# Patient Record
Sex: FEMALE | Race: WHITE | Employment: FULL TIME | URBAN - METROPOLITAN AREA
[De-identification: names, ages, dates, MRNs, and addresses within clinical notes are randomized per-mention and may not be internally consistent; named-entity substitution may affect disease eponyms.]

---

## 2017-02-14 ENCOUNTER — TELEPHONE (OUTPATIENT)
Dept: CARDIOLOGY CLINIC | Age: 62
End: 2017-02-14

## 2017-02-14 NOTE — TELEPHONE ENCOUNTER
----- Message from Bhavesh Pandey RN sent at 1/26/2017  4:01 PM EST -----  Regarding: FW: Non-Urgent Medical Question  Contact: [phone]      ----- Message -----     From: Donavan Katie     Sent: 1/26/2017   3:56 PM       To: [facility] Nurse Pool  Subject: Non-Urgent Medical Question                      Dr. oTm Hector, I didn't realize the Spironolactone was for blood pressure, so that concerns me. Let me explain. .. My pvcs and heart racing started again beginning of this month. That could be due to stress of having to take on ex employee's job in addition to mine. , not sure. In addition, for a long time I have had trouble getting a deep breath but not all the time. This month, that deep breath issue seemed to be happening more frequently. This breathing issue I have attributed to the walking pneumonia I had back in June of 2015, which I felt like I never quite got over.  told me I may have residual cough and shortness of breath for few weeks after I got well. I went back to doctor in Dec 2016 for  (will continue on new message)      Patient identified with 2 identifiers  Called patient to update message. She states she stopped spironolactone on January 22, and within days, she has been breathing fine, no chest pain, no swelling of her extremities. She has occasional PVC's, but no heart racing like she had felt before. 's over 70's, heart rate in 70's. She has not changed any other medications.   Future Appointments  Date Time Provider Felicita Hernández   5/10/2017 10:20 AM Mehdi Abreu  [address]

## 2017-02-28 RX ORDER — POTASSIUM CHLORIDE 600 MG/1
TABLET, FILM COATED, EXTENDED RELEASE ORAL
Qty: 360 TAB | Refills: 0 | Status: SHIPPED | OUTPATIENT
Start: 2017-02-28 | End: 2017-03-15 | Stop reason: ALTCHOICE

## 2017-03-15 ENCOUNTER — OFFICE VISIT (OUTPATIENT)
Dept: CARDIOLOGY CLINIC | Age: 62
End: 2017-03-15

## 2017-03-15 VITALS
WEIGHT: 216.5 LBS | BODY MASS INDEX: 32.07 KG/M2 | SYSTOLIC BLOOD PRESSURE: 150 MMHG | DIASTOLIC BLOOD PRESSURE: 84 MMHG | HEART RATE: 78 BPM | HEIGHT: 69 IN | RESPIRATION RATE: 16 BRPM

## 2017-03-15 DIAGNOSIS — I10 ESSENTIAL HYPERTENSION: ICD-10-CM

## 2017-03-15 DIAGNOSIS — I25.10 ATHEROSCLEROSIS OF NATIVE CORONARY ARTERY OF NATIVE HEART WITHOUT ANGINA PECTORIS: Primary | ICD-10-CM

## 2017-03-15 DIAGNOSIS — I47.1 PSVT (PAROXYSMAL SUPRAVENTRICULAR TACHYCARDIA) (HCC): ICD-10-CM

## 2017-03-15 RX ORDER — SPIRONOLACTONE 25 MG/1
25 TABLET ORAL DAILY
Qty: 90 TAB | Refills: 3 | Status: SHIPPED | OUTPATIENT
Start: 2017-03-15 | End: 2018-01-02 | Stop reason: SDUPTHER

## 2017-03-15 NOTE — PROGRESS NOTES
[facility] [place]:   [phone]    HPI: Rosangela Frey, a 57y.o. year-old who presents for follow up regarding her palpitations and HTN. She has had all the PVC in 2015- stopped the lasix and they went away until 1/17. Started spironolactone in 2/17. She was having trouble getting a deep breath and that got better when she stopped it. Then 2/5 she started with heart racing and that is bothering her at night now. Has several a week now. Rare after lunch she will feel a pvc. After dinner she often has irregular heartbeats. BP is up as well. The deep breath stuff is getting worse. CXR clear but the congestion continues as well. Labs looked good in 2/16 none since. Discussed the options for palps and htn. Offered stopping the dyazide and starting amlodipine or retrial of spironolactone. She feels like anxiety is part of the problem   Wants to know if she can take a higher dose of ativan. Also discussed verapamil that she took before or bystolic. Discussed potential causes of this gurgling and she will discuss it with Dr. Maribel Son at her visit with him later this afternoon  She is on PPI for GERD and sees Dr. Nara Bean annually   LE edema minimal   Reviewed recent labs - K 4.3 on spironolactone 25mg daily and KCL 8meq 4 tablets daily    -She gets her labs at [facility]  -Prefers that our office follow her labs    Stop potassium and dyazide  Start spironolactone 25mg daily  Keep a log of your blood pressure daily  Follow-up in 6 weeks and bring your blood pressure log. A/P:  1. CAD- calcium score of 250 2011, normal stress echo in 7/15, ECG unchanged today, continue aspirin and pravastatin  2. Hyperlipidemia- LDL 83 in 2/16, on pravastatin 40mg daily and fish oil, LDL goal < 70, will check fasting lipids prior to her return visit  3. Overweight- Body mass index is 31.97 kg/(m^2). encouraged her to exercise  4.  PSVT- palpitations well controlled now, could not tolerate Bystolic, previously discussed suppression medications and anti-anxiety medications, not taking Verapamil SR (had been on it in her 30s for SVT at 220bpm but then stopped by Dr. Luca Bo years later), now doing ok with the change in diuretic so potassium seems to be a culprit  -palpitations worsened by dyazide but doing ok on dyazide/spironolactone/KCL combination  -will check CMP and magnesium level prior to her return visit  5. HTN- well controlled on Dyazide   6. Elevated AST/ALT- resolved, will recheck prior to her return visit   7. Insulin Resistance- A1C 5.4% in 2/16, continue diet and exercise, will recheck A1C prior to her return visit   8. Vit D deficiency- on 1000 units daily, will recheck Vitamin D level prior to her return visit  9. Hypokalemia - K 4.3 on spironolactone 25mg daily, dyazide and KCL 8meq 4 tablets daily  Change to spironolactone today alone  10. Edema - well controlled now on spironolactone and dyazide  11. GERD - on Prilosec, followed by GI/Dr. Sharona Hurtado    Stress echo 7/15 exe 9:00, EF 60%, no WMA  Echo 7/15 EF 65%, normal  Stress nuc 2009 nl EF 67%  Ca Score 2011 250    Fhx +CAD    She  has a past medical history of Calculus of kidney; Chronic venous insufficiency; DDD (degenerative disc disease); GERD (gastroesophageal reflux disease); H/O hiatal hernia; History of actinic keratosis; and Hypertension. Cardiovascular ROS: no chest pain or dyspnea on exertion, positive for palpitations  Respiratory ROS: no cough, shortness of breath, or wheezing  Neurological ROS: no TIA or stroke symptoms  All other systems negative except as above. PE  Vitals:    03/15/17 1109   BP: 150/84   Pulse: 78   Resp: 16   Weight: 216 lb 8 oz (98.2 kg)   Height: 5' 9\" (1.753 m)    Body mass index is 31.97 kg/(m^2).   General appearance - alert, well appearing, and in no distress  Mental status - affect appropriate to mood  Eyes - sclera anicteric, moist mucous membranes  Neck - supple  Lymphatics - not assessed   Chest - clear to auscultation, no wheezes, rales or rhonchi  Heart - normal rate, regular rhythm, normal S1, S2, no murmurs, rubs, clicks or gallops  Abdomen - soft, nontender, nondistended  Back exam - full range of motion, no tenderness  Neurological - cranial nerves II through XII grossly intact, no focal deficit  Musculoskeletal - no muscular tenderness noted, normal strength  Extremities - peripheral pulses normal, trace LE edema  Skin - normal coloration  no rashes    12 lead ECG: NSR    Recent Labs:  Lab Results   Component Value Date/Time    Cholesterol, total 172 02/11/2016 10:27 AM    Cholesterol, Total 181 02/11/2016 10:27 AM    HDL Cholesterol 65 02/11/2016 10:27 AM    LDL, calculated 83 02/11/2016 10:27 AM    Triglyceride 118 02/11/2016 10:27 AM     Lab Results   Component Value Date/Time    Creatinine 0.80 02/11/2016 10:27 AM     Lab Results   Component Value Date/Time    BUN 13 02/11/2016 10:27 AM     Lab Results   Component Value Date/Time    Potassium 4.3 02/11/2016 10:27 AM     Lab Results   Component Value Date/Time    Hemoglobin A1c 5.4 02/11/2016 10:27 AM     No components found for: HGBI,  IHGB,  HGB,  HGBP,  WBHGB  Lab Results   Component Value Date/Time    PLATELET 599 61/89/9267 10:27 AM       Reviewed:  Past Medical History:   Diagnosis Date    Calculus of kidney     Chronic venous insufficiency     DDD (degenerative disc disease)     GERD (gastroesophageal reflux disease)     H/O hiatal hernia     gastritis    History of actinic keratosis     basal cell    Hypertension      History   Smoking Status    Never Smoker   Smokeless Tobacco    Never Used     History   Alcohol Use    0.0 oz/week    0 Standard drinks or equivalent per week     Comment: rarely     Allergies   Allergen Reactions    Boniva [Ibandronate] Other (comments)     Dysphagia      Carac [Fluorouracil] Cough and Other (comments)     Cogestion    Cozaar [Losartan] Shortness of Breath    Lisinopril Cough    Preservative Other (comments)     Preservative in eye drops make eyes red      Red Dye Hives    Sulfa (Sulfonamide Antibiotics) Rash       Current Outpatient Prescriptions   Medication Sig    KLOR-CON 8 8 mEq tablet TAKE 4 TABLETS BY MOUTH EVERY DAY    LORazepam (ATIVAN) 0.5 mg tablet TAKE 1 TABLET BY MOUTH EVERY DAY AS NEEDED FOR ANXIETY    OMEGA-3 FATTY ACIDS/FISH OIL (OMEGA 3 FISH OIL PO) Take  by mouth two (2) times a day. Indications: 1220 mg fish oil 360 mg omega-e    cholecalciferol (VITAMIN D3) 1,000 unit cap Take  by mouth daily.  triamterene-hydrochlorothiazide (DYAZIDE) 37.5-25 mg per capsule Take 1 Cap by mouth daily.  pravastatin (PRAVACHOL) 40 mg tablet TAKE 1 TABLET BY MOUTH EVERY DAY    multivitamin (ONE A DAY) tablet Take 1 Tab by mouth daily.  cranberry extract-vit C (AZO CRANBERRY PLUS VIT C) 250-60 mg cap Take 1 Tab by mouth daily.  omeprazole (PRILOSEC) 20 mg capsule Take 20 mg by mouth daily.  aspirin 81 mg tablet Take 162 mg by mouth daily.  amoxicillin-clavulanate (AUGMENTIN) 875-125 mg per tablet Take 1 Tab by mouth two (2) times a day. No current facility-administered medications for this visit.         [facility] [place]  [address]  [facility], [address] [place] [address]

## 2017-03-15 NOTE — MR AVS SNAPSHOT
Visit Information Date & Time Provider Department Dept. Phone Encounter #  
 3/15/2017 10:40 AM Chandan Mosqueda MD [facility] [place] [phone] [id number] Your Appointments 5/10/2017 10:20 AM  
ESTABLISHED PATIENT with Chandan Mosqueda MD  
[facility] ([address]) Appt Note: 6 month follow up  
 [address]  
[address] [facility] 7 [address] Upcoming Health Maintenance Date Due Hepatitis C Screening 1955 BREAST CANCER SCRN MAMMOGRAM 1/29/2015 PAP AKA CERVICAL CYTOLOGY 10/15/2016 COLONOSCOPY 1/3/2024 DTaP/Tdap/Td series (2 - Td) 9/28/2025 Allergies as of 3/15/2017  Review Complete On: 3/15/2017 By: Nino Finn Severity Noted Reaction Type Reactions Boniva [Ibandronate]  04/25/2012    Other (comments) Dysphagia Carac [Fluorouracil]  10/17/2012    Cough, Other (comments) Cogestion Cozaar [Losartan]  05/18/2012    Shortness of Breath Lisinopril  02/15/2012    Cough Preservative  04/18/2012    Other (comments) Preservative in eye drops make eyes red Red Dye  02/15/2012    Hives Sulfa (Sulfonamide Antibiotics)  02/15/2012    Rash Current Immunizations  Reviewed on 4/14/2014 Name Date Influenza Vaccine 10/1/2016, 10/10/2015, 10/8/2014, 10/14/2013 Influenza Vaccine Whole 9/17/2012 TD Vaccine 10/17/2003 Tdap 9/28/2015 Zoster Vaccine, Live 1/16/2016 Not reviewed this visit You Were Diagnosed With   
  
 Codes Comments Atherosclerosis of native coronary artery of native heart without angina pectoris    -  Primary ICD-10-CM: I25.10 ICD-9-CM: 414.01 Essential hypertension     ICD-10-CM: I10 
ICD-9-CM: 401.9 PSVT (paroxysmal supraventricular tachycardia) (HCC)     ICD-10-CM: I47.1 ICD-9-CM: 427.0 Vitals BP Pulse Resp Height(growth percentile) Weight(growth percentile) BMI  
 150/84 (BP 1 Location: Right arm, BP Patient Position: Sitting) 78 16 5' 9\" (1.753 m) 216 lb 8 oz (98.2 kg) 31.97 kg/m2 OB Status Smoking Status Postmenopausal Never Smoker Vitals History BMI and BSA Data Body Mass Index Body Surface Area  
 31.97 kg/m 2 2.19 m 2 Preferred Pharmacy Pharmacy Name Phone [facility] [facility] [facility] [address] [phone] Your Updated Medication List  
  
   
This list is accurate as of: 3/15/17 11:47 AM.  Always use your most recent med list.  
  
  
  
  
 amoxicillin-clavulanate 875-125 mg per tablet Commonly known as:  AUGMENTIN Take 1 Tab by mouth two (2) times a day. aspirin 81 mg tablet Take 162 mg by mouth daily. cranberry extract-vitamin C 250-60 mg Cap Commonly known as:  AZO CRANBERRY PLUS VIT C Take 1 Tab by mouth daily. LORazepam 0.5 mg tablet Commonly known as:  ATIVAN  
TAKE 1 TABLET BY MOUTH EVERY DAY AS NEEDED FOR ANXIETY  
  
 multivitamin tablet Commonly known as:  ONE A DAY Take 1 Tab by mouth daily. OMEGA 3 FISH OIL PO Take  by mouth two (2) times a day. Indications: 1220 mg fish oil 360 mg omega-e  
  
 omeprazole 20 mg capsule Commonly known as:  PRILOSEC Take 20 mg by mouth daily. pravastatin 40 mg tablet Commonly known as:  PRAVACHOL  
TAKE 1 TABLET BY MOUTH EVERY DAY  
  
 spironolactone 25 mg tablet Commonly known as:  ALDACTONE Take 1 Tab by mouth daily. VITAMIN D3 1,000 unit Cap Generic drug:  cholecalciferol Take  by mouth daily. Prescriptions Sent to Pharmacy Refills  
 spironolactone (ALDACTONE) 25 mg tablet 3 Sig: Take 1 Tab by mouth daily. Class: Normal  
 Pharmacy: [facility] [address] [place] [address] Drive [address] Ph #: [phone] Route: Oral  
  
We Performed the Following AMB POC EKG ROUTINE W/ 12 LEADS, INTER & REP [40276 CPT(R)] Patient Instructions Stop potassium and dyazide Start spironolactone 25mg daily Keep a log of your blood pressure daily Follow-up in 6 weeks and bring your blood pressure log. Introducing \[facility]\"" & [facility]! Dear Erin Murphy: Thank you for requesting a [employer] account. Our records indicate that you already have an active GAMEVIL account. You can access your account anytime at [email or web address] Did you know that you can access your hospital and ER discharge instructions at any time in GAMEVIL? You can also review all of your test results from your hospital stay or ER visit. Additional Information If you have questions, please visit the Frequently Asked Questions section of the GAMEVIL website at [email or web address] Remember, GAMEVIL is NOT to be used for urgent needs. For medical emergencies, dial 911. Now available from your iPhone and Android! Please provide this summary of care documentation to your next provider. Your primary care clinician is listed as Jan Infante. If you have any questions after today's visit, please call [phone].

## 2017-03-15 NOTE — PATIENT INSTRUCTIONS
Stop potassium and dyazide  Start spironolactone 25mg daily  Keep a log of your blood pressure daily  Follow-up in 6 weeks and bring your blood pressure log.

## 2017-04-03 ENCOUNTER — TELEPHONE (OUTPATIENT)
Dept: CARDIOLOGY CLINIC | Age: 62
End: 2017-04-03

## 2017-04-03 RX ORDER — FUROSEMIDE 20 MG/1
20 TABLET ORAL
Qty: 30 TAB | Refills: 6 | Status: SHIPPED | OUTPATIENT
Start: 2017-04-03 | End: 2017-08-09 | Stop reason: SDUPTHER

## 2017-04-03 NOTE — TELEPHONE ENCOUNTER
Patient identified with 2 identifiers  Returned patient phone call after hearing back from Dr. Staci Kwok. She will start lasix 20 mg as needed for swelling. She asked about exercising, i encouraged her to exercise, stop as needed for chest pain, shortness of breath.

## 2017-04-03 NOTE — TELEPHONE ENCOUNTER
The patient requested a call back on [phone]. She wanted to confirm the receipt of her BigTwist message. She stated that all her BP readings are listed. She also wanted to add that she hasn't been feeling well. She has a little cough and dry throat. Thanks!

## 2017-04-03 NOTE — TELEPHONE ENCOUNTER
----- Message from Rossy Maxwell RN sent at 4/3/2017  7:39 AM EDT -----  Regarding: FW: Visit Follow-Up Question  Contact: [phone]      ----- Message -----     From: Cap[place]ano     Sent: 4/2/2017   9:37 PM       To: Eli Lacy  Subject: Visit Smoot Son Dr. Jerald Lepe,  I have continued to keep my blood pressure results since my last email to you. Looks like my BP has been a little higher than last results I sent you. All taken between 6:00 p.m. and 10:00 p.m. after dinner or after watching tv.  3/27 146/69 pulse 68;  3/28 138/67 pulse 73;  3/29 145/73 pulse 70;  3/30 151/69 pulse 66;  3/31 163/78 pulse 58;  4/1 163/78 pulse 68;  4/2 150/71 pulse 67. Also, i am carrying extra fluid since off HCTZ/Triam....rings are tight, ankles swelling some after being up during the day. Not bad but we are going away this weekend and I am concerned ankles will be huge since I will be on my feet a lot. Felix Romero    Patient identified with 2 identifiers  Returned patient phone call, patient wanted to be sure we had gotten her message, as well as to know that she has a bad cold, she has not taken any cold medication. She is not short of breath and no chest pain, she is just concerned about the increased leg edema. Please advise, thank you.

## 2017-04-18 ENCOUNTER — OFFICE VISIT (OUTPATIENT)
Dept: INTERNAL MEDICINE CLINIC | Age: 62
End: 2017-04-18

## 2017-04-18 ENCOUNTER — TELEPHONE (OUTPATIENT)
Dept: CARDIOLOGY CLINIC | Age: 62
End: 2017-04-18

## 2017-04-18 VITALS
DIASTOLIC BLOOD PRESSURE: 80 MMHG | TEMPERATURE: 99.6 F | RESPIRATION RATE: 18 BRPM | HEIGHT: 69 IN | HEART RATE: 95 BPM | BODY MASS INDEX: 32.73 KG/M2 | OXYGEN SATURATION: 95 % | WEIGHT: 221 LBS | SYSTOLIC BLOOD PRESSURE: 140 MMHG

## 2017-04-18 DIAGNOSIS — R09.82 POST-NASAL DRIP: ICD-10-CM

## 2017-04-18 DIAGNOSIS — R05.9 COUGH: ICD-10-CM

## 2017-04-18 DIAGNOSIS — Z20.828 EXPOSURE TO INFLUENZA: Primary | ICD-10-CM

## 2017-04-18 LAB
QUICKVUE INFLUENZA TEST: NEGATIVE
VALID INTERNAL CONTROL?: YES

## 2017-04-18 RX ORDER — MUPIROCIN 20 MG/G
OINTMENT TOPICAL
COMMUNITY
Start: 2017-04-17 | End: 2017-07-12 | Stop reason: ALTCHOICE

## 2017-04-18 RX ORDER — HYDROCORTISONE 25 MG/G
CREAM TOPICAL
Refills: 1 | COMMUNITY
Start: 2017-02-13 | End: 2017-07-12 | Stop reason: ALTCHOICE

## 2017-04-18 RX ORDER — OSELTAMIVIR PHOSPHATE 75 MG/1
75 CAPSULE ORAL DAILY
Qty: 10 CAP | Refills: 0 | Status: SHIPPED | OUTPATIENT
Start: 2017-04-18 | End: 2017-04-28

## 2017-04-18 NOTE — MR AVS SNAPSHOT
Visit Information Date & Time Provider Department Dept. Phone Encounter #  
 4/18/2017 11:00 AM [facility]  Your Appointments 5/24/2017  9:20 AM  
ESTABLISHED PATIENT with Kourtney Castillo MD  
[facility] ([facility]) Appt Note: 6 month follow up; 6 month follow up pt r/s from [address]  
[address] [facility] 7 [address] Upcoming Health Maintenance Date Due Hepatitis C Screening 1955 BREAST CANCER SCRN MAMMOGRAM 1/29/2015 PAP AKA CERVICAL CYTOLOGY 10/15/2016 COLONOSCOPY 1/3/2024 DTaP/Tdap/Td series (2 - Td) 9/28/2025 Allergies as of 4/18/2017  Review Complete On: 4/18/2017 By: Olamide Cabrera LPN Severity Noted Reaction Type Reactions Boniva [Ibandronate]  04/25/2012    Other (comments) Dysphagia Carac [Fluorouracil]  10/17/2012    Cough, Other (comments) Cogestion Cozaar [Losartan]  05/18/2012    Shortness of Breath Lisinopril  02/15/2012    Cough Preservative  04/18/2012    Other (comments) Preservative in eye drops make eyes red Red Dye  02/15/2012    Hives Sulfa (Sulfonamide Antibiotics)  02/15/2012    Rash Current Immunizations  Reviewed on 4/14/2014 Name Date Influenza Vaccine 10/1/2016, 10/10/2015, 10/8/2014, 10/14/2013 Influenza Vaccine Whole 9/17/2012 TD Vaccine 10/17/2003 Tdap 9/28/2015 Zoster Vaccine, Live 1/16/2016 Not reviewed this visit You Were Diagnosed With   
  
 Codes Comments Exposure to influenza    -  Primary ICD-10-CM: Z20.828 ICD-9-CM: V01.79 Cough     ICD-10-CM: R05 ICD-9-CM: 786.2 Post-nasal drip     ICD-10-CM: R09.82 ICD-9-CM: 784.91 Vitals BP Pulse Temp Resp Height(growth percentile) Weight(growth percentile) 140/80 (BP 1 Location: Left arm, BP Patient Position: Sitting) 95 99.6 °F (37.6 °C) (Oral) 18 5' 9\" (1.753 m) 221 lb (100.2 kg) SpO2 BMI OB Status Smoking Status 95% 32.64 kg/m2 Postmenopausal Never Smoker Vitals History BMI and BSA Data Body Mass Index Body Surface Area  
 32.64 kg/m 2 2.21 m 2 Preferred Pharmacy Pharmacy Name Phone [address], [address] [place] [address] [address] [phone] Your Updated Medication List  
  
   
This list is accurate as of: 4/18/17 12:20 PM.  Always use your most recent med list.  
  
  
  
  
 aspirin 81 mg tablet Take 162 mg by mouth daily. cranberry extract-vitamin C 250-60 mg Cap Commonly known as:  AZO CRANBERRY PLUS VIT C Take 1 Tab by mouth daily. furosemide 20 mg tablet Commonly known as:  LASIX Take 1 Tab by mouth daily as needed. hydrocortisone 2.5 % topical cream  
Commonly known as:  HYTONE From Derm for face LORazepam 0.5 mg tablet Commonly known as:  ATIVAN  
TAKE 1 TABLET BY MOUTH EVERY DAY AS NEEDED FOR ANXIETY  
  
 multivitamin tablet Commonly known as:  ONE A DAY Take 1 Tab by mouth daily. mupirocin 2 % ointment Commonly known as:  [facility] For nasal lesion OMEGA 3 FISH OIL PO Take  by mouth two (2) times a day. Indications: 1220 mg fish oil 360 mg omega-e  
  
 omeprazole 20 mg capsule Commonly known as:  PRILOSEC Take 20 mg by mouth daily. oseltamivir 75 mg capsule Commonly known as:  TAMIFLU Take 1 Cap by mouth daily for 10 days. pravastatin 40 mg tablet Commonly known as:  PRAVACHOL  
TAKE 1 TABLET BY MOUTH EVERY DAY  
  
 spironolactone 25 mg tablet Commonly known as:  ALDACTONE Take 1 Tab by mouth daily. VITAMIN D3 1,000 unit Cap Generic drug:  cholecalciferol Take  by mouth daily. Prescriptions Printed Refills oseltamivir (TAMIFLU) 75 mg capsule 0 Sig: Take 1 Cap by mouth daily for 10 days. Class: Print Route: Oral  
  
We Performed the Following AMB POC RAPID INFLUENZA TEST [86079 CPT(R)] Patient Instructions Saline Nasal Spray - use liberally with head tilted back - to loosen and flush post-nasal mucus - OK to swallow Mucinex 600 mg tablets - PLAIN - Blue Box - one pill twice a day with full glass of water to help make mucus more easy to move Hydration - helps thin mucus Ask Dr. Ernie Maya whether you could take a long-acting anti-histamine to help manage your allergies (Zyrtec or Allegra or Claritin). Introducing [facility]! Dear Sena Bravo: Thank you for requesting a [employer] account. Our records indicate that you already have an active [employer] account. You can access your account anytime at [email or web address] Did you know that you can access your hospital and ER discharge instructions at any time in [employer]? You can also review all of your test results from your hospital stay or ER visit. Additional Information If you have questions, please visit the Frequently Asked Questions section of the [employer] website at [email or web address] Remember, [employer] is NOT to be used for urgent needs. For medical emergencies, dial 911. Now available from your iPhone and Android! Please provide this summary of care documentation to your next provider. Your primary care clinician is listed as Trini Judge. If you have any questions after today's visit, please call [phone].

## 2017-04-18 NOTE — TELEPHONE ENCOUNTER
([phone]) Pt walked into office wanting to know if she can take Zyrtec, Allegra or Claritin-  As as a long acting anti- histamine to help manage her allergies?   Pt would like to be called at your earliest conveniest.    Thanks Erica

## 2017-04-18 NOTE — TELEPHONE ENCOUNTER
Returned patient's call,  2 pt identifiers used  The following message given per NP Neelam:    She may take Allegra but not Allegra-D, can take Claritin but not Claritin-D, can take Zyrtec but not Zyrtec-D.  No pseudoephedrine. neelam     Patient voiced understanding.

## 2017-04-18 NOTE — PATIENT INSTRUCTIONS
Saline Nasal Spray - use liberally with head tilted back - to loosen and flush post-nasal mucus - OK to swallow    Mucinex 600 mg tablets - PLAIN - Blue Box - one pill twice a day with full glass of water to help make mucus more easy to move    Hydration - helps thin mucus    Ask Dr. Mahamed Baker whether you could take a long-acting anti-histamine to help manage your allergies (Zyrtec or Allegra or Claritin).

## 2017-04-18 NOTE — PROGRESS NOTES
58 yo female with cough intermittently since Dec.  Tx for allergies and an antibiotic didn't completely resolve this cough. CXR in Nov was normal.   For 3 days the cough has felt worse. Last night, she had chills, and today she feels achy. She is clearing her throat a lot, too. Her  was seen at an Urgent Care 3 days ago, and placed on Tamiflu and a med for cough suppression. PE: WNWD WF w dry-sounding cough   T - 99.6 in AM   Phar - nl   Neck - no nodes   TMs - nl   Lungs - clear  Rapid Influenza Test - NEG    Imp: Persistent cough likely due to Post-nasal drip   Exposure to Influenza    Plan: Tamiflu for 10 days   Saline NS   Hydration and Mucinex   Consider Zyrtec or Allegra (she will consult with her Cardiologist)  ______________________  Expected course of current diagnosed problem(s) as well as expected progression and possible complications, and desired follow up with provider are discussed with patient. Patient is encouraged to be back in touch with any questions or concerns. Patient expresses understanding of plan of care. Patient is given AVS which includes diagnoses, current medications, vitals.

## 2017-04-18 NOTE — PROGRESS NOTES
Chief Complaint   Patient presents with    Cough     Patient stated her  tested positive through a blood test on Saturday.     Generalized Body Aches    Chills

## 2017-04-25 ENCOUNTER — TELEPHONE (OUTPATIENT)
Dept: INTERNAL MEDICINE CLINIC | Age: 62
End: 2017-04-25

## 2017-04-25 NOTE — TELEPHONE ENCOUNTER
Magali Ibrahim  1955     Pt called stating that she still has a cough and would like to know hoe long she she is suppose to let it continue before she need to come back in.

## 2017-04-25 NOTE — TELEPHONE ENCOUNTER
Cough not as bad, but still present. The \"coakiness\" is gone. She is still having her \"breathing issue\" which is long-standing. Her Cardiologist told her she needs to walk and exercise more. No hx asthma. She takes Omeprazole with almost complete resolution of GERD w/ water brash. She continues with the saline NS, Mucinex, Anti-histamine. Pulmonary testing?

## 2017-05-18 ENCOUNTER — TELEPHONE (OUTPATIENT)
Dept: CARDIOLOGY CLINIC | Age: 62
End: 2017-05-18

## 2017-05-18 NOTE — TELEPHONE ENCOUNTER
I spoke to technician at [facility] labs, 2 pt identifiers used  Advised to do regular lipid panel at this time. She was unable to find a comparable lab as [facility] NMR. Also did not know if a more in depth test would be an additional cost to the patient.

## 2017-05-18 NOTE — TELEPHONE ENCOUNTER
Marlyn Oconnor with [facility] called to advise that they don't do the NMR's and stated they have something that's compatible. If interested give her a call back to [phone].  [employer]

## 2017-05-24 ENCOUNTER — OFFICE VISIT (OUTPATIENT)
Dept: CARDIOLOGY CLINIC | Age: 62
End: 2017-05-24

## 2017-05-24 VITALS
WEIGHT: 224 LBS | SYSTOLIC BLOOD PRESSURE: 150 MMHG | HEART RATE: 69 BPM | BODY MASS INDEX: 33.18 KG/M2 | RESPIRATION RATE: 16 BRPM | HEIGHT: 69 IN | DIASTOLIC BLOOD PRESSURE: 80 MMHG | OXYGEN SATURATION: 97 %

## 2017-05-24 DIAGNOSIS — E66.3 OVERWEIGHT (BMI 25.0-29.9): ICD-10-CM

## 2017-05-24 DIAGNOSIS — E78.5 HYPERLIPIDEMIA, UNSPECIFIED HYPERLIPIDEMIA TYPE: ICD-10-CM

## 2017-05-24 DIAGNOSIS — I10 ESSENTIAL HYPERTENSION: ICD-10-CM

## 2017-05-24 DIAGNOSIS — I25.10 ATHEROSCLEROSIS OF NATIVE CORONARY ARTERY OF NATIVE HEART WITHOUT ANGINA PECTORIS: Primary | ICD-10-CM

## 2017-05-24 DIAGNOSIS — I47.1 PSVT (PAROXYSMAL SUPRAVENTRICULAR TACHYCARDIA) (HCC): ICD-10-CM

## 2017-05-24 RX ORDER — AMLODIPINE BESYLATE 2.5 MG/1
2.5 TABLET ORAL DAILY
Qty: 30 TAB | Refills: 3 | Status: SHIPPED | OUTPATIENT
Start: 2017-05-24 | End: 2017-06-19 | Stop reason: SDUPTHER

## 2017-05-24 NOTE — PROGRESS NOTES
[place]:   [phone]    HPI: Zarina Doss, a 57y.o. year-old who presents for follow up regarding her palpitations and HTN. Palpitations are  Better, the ones that last hours age gone and she is happy about that. Breathing is a little better as well. She is very frustrated about her weight. Exercising regularly every day but worrying about her weight every day. Reviewed her labs, lipids look great, K is fine, lft ok. She takes lasix prn and will often take it three days in a row when needed. She has had all the PVC in 2015- stopped the lasix and they went away until 1/17. Started spironolactone in 2/17. She was having trouble getting a deep breath and that got better when she stopped it. Then 2/5 she started with heart racing and that is bothering her at night now. Has several a week now. Rare after lunch she will feel a pvc. After dinner she often has irregular heartbeats. BP is up as well. The deep breath stuff is getting worse. CXR clear but the congestion continues as well. Labs looked good in 2/16 none since. Discussed the options for palps and htn. Offered stopping the dyazide and starting amlodipine or retrial of spironolactone. She feels like anxiety is part of the problem   Wants to know if she can take a higher dose of ativan. Also discussed verapamil that she took before or bystolic.   n  She is on PPI for GERD and sees Dr. Allyson Shah annually   LE edema minimal     -She gets her labs at [facility]  -Prefers that our office follow her labs    Start spironolactone 25mg daily  Bp looks great today but she has been worried about it, 160/70 at home. Yesterday it was 190 at [facility] after she was very stressed. My recheck today 150/70. A/P:  1. CAD- calcium score of 250 2011, continue aspirin and pravastatin  2. Hyperlipidemia- LDL 83 in 2/16, on pravastatin 40mg daily and fish oil, LDL goal < 70, will check fasting lipids prior to her return visit  3.  Overweight- Body mass index is 33.08 kg/(m^2). encouraged her to exercise  4. PSVT- palpitations well controlled now, could not tolerate Bystolic, previously discussed suppression medications and anti-anxiety medications, not taking Verapamil SR (had been on it in her 30s for SVT at 220bpm but then stopped by Dr. Seven Holder years later), now doing ok with the change in diuretic so potassium seems to be a culprit  -palpitations worsened by dyazide but for a while doing ok on dyazide/spironolactone/KCL combination, now palps good on spironolactone alone but   -will check CMP and magnesium level prior to her return visit  5. HTN- add amlodipine today and continue spironolactone  6. Elevated AST/ALT- resolved, will recheck prior to her return visit   7. Insulin Resistance- A1C 5.4% in 2/16, continue diet and exercise, will recheck A1C prior to her return visit   8. Vit D deficiency- on 1000 units daily, will recheck Vitamin D level prior to her return visit  9. Hypokalemia - stable now, K 4.1  10. Edema - well controlled now on spironolactone and dyazide  11. GERD - on Prilosec, followed by GI/Dr. Tommy Bains    Stress echo 7/15 exe 9:00, EF 60%, no WMA  Echo 7/15 EF 65%, normal  Stress nuc 2009 nl EF 67%  Ca Score 2011 250    Fhx +CAD    She  has a past medical history of Calculus of kidney; Chronic venous insufficiency; DDD (degenerative disc disease); GERD (gastroesophageal reflux disease); H/O hiatal hernia; History of actinic keratosis; and Hypertension. Cardiovascular ROS: no chest pain or dyspnea on exertion, positive for palpitations  Respiratory ROS: no cough, shortness of breath, or wheezing  Neurological ROS: no TIA or stroke symptoms  All other systems negative except as above. PE  Vitals:    05/24/17 0917 05/24/17 0920   BP: 130/80 130/80   Pulse: 69    Resp: 16    SpO2: 97%    Weight: 224 lb (101.6 kg)    Height: 5' 9\" (1.753 m)     Body mass index is 33.08 kg/(m^2).   General appearance - alert, well appearing, and in no distress  Mental status - affect appropriate to mood  Eyes - sclera anicteric, moist mucous membranes  Neck - supple  Lymphatics - not assessed   Chest - clear to auscultation, no wheezes, rales or rhonchi  Heart - normal rate, regular rhythm, normal S1, S2, no murmurs, rubs, clicks or gallops  Abdomen - soft, nontender, nondistended  Back exam - full range of motion, no tenderness  Neurological - cranial nerves II through XII grossly intact, no focal deficit  Musculoskeletal - no muscular tenderness noted, normal strength  Extremities - peripheral pulses normal, trace LE edema  Skin - normal coloration  no rashes    12 lead ECG: NSR    Recent Labs:  Lab Results   Component Value Date/Time    Cholesterol, total 172 02/11/2016 10:27 AM    Cholesterol, Total 181 02/11/2016 10:27 AM    HDL Cholesterol 65 02/11/2016 10:27 AM    LDL, calculated 83 02/11/2016 10:27 AM    Triglyceride 118 02/11/2016 10:27 AM     Lab Results   Component Value Date/Time    Creatinine 0.80 02/11/2016 10:27 AM     Lab Results   Component Value Date/Time    BUN 13 02/11/2016 10:27 AM     Lab Results   Component Value Date/Time    Potassium 4.3 02/11/2016 10:27 AM     Lab Results   Component Value Date/Time    Hemoglobin A1c 5.4 02/11/2016 10:27 AM     No components found for: HGBI,  IHGB,  HGB,  HGBP,  WBHGB  Lab Results   Component Value Date/Time    PLATELET 380 14/86/1764 10:27 AM       Reviewed:  Past Medical History:   Diagnosis Date    Calculus of kidney     Chronic venous insufficiency     DDD (degenerative disc disease)     GERD (gastroesophageal reflux disease)     H/O hiatal hernia     gastritis    History of actinic keratosis     basal cell    Hypertension      History   Smoking Status    Never Smoker   Smokeless Tobacco    Never Used     History   Alcohol Use    0.0 oz/week    0 Standard drinks or equivalent per week     Comment: rarely     Allergies   Allergen Reactions    Boniva [Ibandronate] Other (comments) Dysphagia      Carac [Fluorouracil] Cough and Other (comments)     Cogestion    Cozaar [Losartan] Shortness of Breath    Lisinopril Cough    Preservative Other (comments)     Preservative in eye drops make eyes red      Red Dye Hives    Sulfa (Sulfonamide Antibiotics) Rash       Current Outpatient Prescriptions   Medication Sig    hydrocortisone (HYTONE) 2.5 % topical cream From Derm for face    mupirocin (BACTROBAN) 2 % ointment For nasal lesion    furosemide (LASIX) 20 mg tablet Take 1 Tab by mouth daily as needed.  spironolactone (ALDACTONE) 25 mg tablet Take 1 Tab by mouth daily.  LORazepam (ATIVAN) 0.5 mg tablet TAKE 1 TABLET BY MOUTH EVERY DAY AS NEEDED FOR ANXIETY    OMEGA-3 FATTY ACIDS/FISH OIL (OMEGA 3 FISH OIL PO) Take  by mouth two (2) times a day. Indications: 1220 mg fish oil 360 mg omega-e    cholecalciferol (VITAMIN D3) 1,000 unit cap Take  by mouth daily.  pravastatin (PRAVACHOL) 40 mg tablet TAKE 1 TABLET BY MOUTH EVERY DAY    multivitamin (ONE A DAY) tablet Take 1 Tab by mouth daily.  cranberry extract-vit C (AZO CRANBERRY PLUS VIT C) 250-60 mg cap Take 1 Tab by mouth daily.  omeprazole (PRILOSEC) 20 mg capsule Take 20 mg by mouth daily.  aspirin 81 mg tablet Take 162 mg by mouth daily. No current facility-administered medications for this visit.         [facility] [place]  [address]

## 2017-05-24 NOTE — MR AVS SNAPSHOT
Visit Information Date & Time Provider Department Dept. Phone Encounter #  
 5/24/2017  9:20 AM Pamala Bumpers, MD [facility] [phone] [id number] Follow-up Instructions Return in about 4 months (around 9/24/2017). Follow-up and Disposition History Upcoming Health Maintenance Date Due Hepatitis C Screening 1955 BREAST CANCER SCRN MAMMOGRAM 1/29/2015 PAP AKA CERVICAL CYTOLOGY 10/15/2016 INFLUENZA AGE 9 TO ADULT 8/1/2017 COLONOSCOPY 1/3/2024 DTaP/Tdap/Td series (2 - Td) 9/28/2025 Allergies as of 5/24/2017  Review Complete On: 5/24/2017 By: Rudy Mejia Severity Noted Reaction Type Reactions Boniva [Ibandronate]  04/25/2012    Other (comments) Dysphagia Carac [Fluorouracil]  10/17/2012    Cough, Other (comments) Cogestion Cozaar [Losartan]  05/18/2012    Shortness of Breath Lisinopril  02/15/2012    Cough Preservative  04/18/2012    Other (comments) Preservative in eye drops make eyes red Red Dye  02/15/2012    Hives Sulfa (Sulfonamide Antibiotics)  02/15/2012    Rash Current Immunizations  Reviewed on 4/14/2014 Name Date Influenza Vaccine 10/1/2016, 10/10/2015, 10/8/2014, 10/14/2013 Influenza Vaccine Whole 9/17/2012 TD Vaccine 10/17/2003 Tdap 9/28/2015 Zoster Vaccine, Live 1/16/2016 Not reviewed this visit You Were Diagnosed With   
  
 Codes Comments Atherosclerosis of native coronary artery of native heart without angina pectoris    -  Primary ICD-10-CM: I25.10 ICD-9-CM: 414.01 Hyperlipidemia, unspecified hyperlipidemia type     ICD-10-CM: E78.5 ICD-9-CM: 272.4 PSVT (paroxysmal supraventricular tachycardia) (HCC)     ICD-10-CM: I47.1 ICD-9-CM: 427.0 Essential hypertension     ICD-10-CM: I10 
ICD-9-CM: 401.9 Overweight (BMI 25.0-29. 9)     ICD-10-CM: S97.8 ICD-9-CM: 278.02 Vitals BP Pulse Resp Height(growth percentile) Weight(growth percentile) SpO2  
 150/80 (BP 1 Location: Right arm, BP Patient Position: Sitting) 69 16 5' 9\" (1.753 m) 224 lb (101.6 kg) 97% BMI OB Status Smoking Status 33.08 kg/m2 Postmenopausal Never Smoker Vitals History BMI and BSA Data Body Mass Index Body Surface Area 33.08 kg/m 2 2.22 m 2 Preferred Pharmacy Pharmacy Name Phone [address], [address] [facility] [address] [phone] Your Updated Medication List  
  
   
This list is accurate as of: 5/24/17 10:04 AM.  Always use your most recent med list. amLODIPine 2.5 mg tablet Commonly known as:  Dion Beata Take 1 Tab by mouth daily. aspirin 81 mg tablet Take 162 mg by mouth daily. cranberry extract-vitamin C 250-60 mg Cap Commonly known as:  AZO CRANBERRY PLUS VIT C Take 1 Tab by mouth daily. furosemide 20 mg tablet Commonly known as:  LASIX Take 1 Tab by mouth daily as needed. hydrocortisone 2.5 % topical cream  
Commonly known as:  HYTONE From Derm for face LORazepam 0.5 mg tablet Commonly known as:  ATIVAN  
TAKE 1 TABLET BY MOUTH EVERY DAY AS NEEDED FOR ANXIETY  
  
 multivitamin tablet Commonly known as:  ONE A DAY Take 1 Tab by mouth daily. mupirocin 2 % ointment Commonly known as:  [facility] For nasal lesion OMEGA 3 FISH OIL PO Take  by mouth two (2) times a day. Indications: 1220 mg fish oil 360 mg omega-e  
  
 omeprazole 20 mg capsule Commonly known as:  PRILOSEC Take 20 mg by mouth daily. pravastatin 40 mg tablet Commonly known as:  PRAVACHOL  
TAKE 1 TABLET BY MOUTH EVERY DAY  
  
 spironolactone 25 mg tablet Commonly known as:  ALDACTONE Take 1 Tab by mouth daily. VITAMIN D3 1,000 unit Cap Generic drug:  cholecalciferol Take  by mouth daily. Prescriptions Sent to Pharmacy Refills  
 amLODIPine (NORVASC) 2.5 mg tablet 3 Sig: Take 1 Tab by mouth daily. Class: Normal  
 Pharmacy: [facility] [address] [place] [address] Drive [address]  #: [phone] Route: Oral  
  
We Performed the Following AMB POC EKG ROUTINE W/ 12 LEADS, INTER & REP [98335 CPT(R)] Follow-up Instructions Return in about 4 months (around 9/24/2017). Introducing [facility]! Dear Geoffrey Royal: Thank you for requesting a [employer] account. Our records indicate that you already have an active [employer] account. You can access your account anytime at [email or web address]. "[email or web address] Did you know that you can access your hospital and ER discharge instructions at any time in [employer]? You can also review all of your test results from your hospital stay or ER visit. Additional Information If you have questions, please visit the Frequently Asked Questions section of the [employer] website at [email or web address] Remember, [employer] is NOT to be used for urgent needs. For medical emergencies, dial 911. Now available from your iPhone and Android! Please provide this summary of care documentation to your next provider. Your primary care clinician is listed as Caridad Carlton. If you have any questions after today's visit, please call [phone].

## 2017-05-30 RX ORDER — POTASSIUM CHLORIDE 600 MG/1
TABLET, FILM COATED, EXTENDED RELEASE ORAL
Qty: 360 TAB | Refills: 0 | Status: SHIPPED | OUTPATIENT
Start: 2017-05-30 | End: 2018-01-03 | Stop reason: SDUPTHER

## 2017-06-06 ENCOUNTER — TELEPHONE (OUTPATIENT)
Dept: CARDIOLOGY CLINIC | Age: 62
End: 2017-06-06

## 2017-06-06 NOTE — TELEPHONE ENCOUNTER
Alvin Gus Getachew calling to f/u on her [address] email. She can be reached at [phone]. Thanks!

## 2017-06-06 NOTE — TELEPHONE ENCOUNTER
Returned patient's call, 2 pt identifiers used  Advised patient her my-chart e-mail has been received and  Also Dr. Eleazar Mondragon is out of the office until 6/12/17. Her message has been sent to ROBERTO Reza.

## 2017-06-16 RX ORDER — AMLODIPINE BESYLATE 2.5 MG/1
2.5 TABLET ORAL DAILY
Qty: 30 TAB | Refills: 3 | OUTPATIENT
Start: 2017-06-16

## 2017-06-19 RX ORDER — AMLODIPINE BESYLATE 2.5 MG/1
5 TABLET ORAL DAILY
Qty: 60 TAB | Refills: 11 | Status: SHIPPED | OUTPATIENT
Start: 2017-06-19 | End: 2018-04-20 | Stop reason: SDUPTHER

## 2017-06-22 RX ORDER — LORAZEPAM 0.5 MG/1
TABLET ORAL
Qty: 60 TAB | Refills: 3 | Status: SHIPPED | OUTPATIENT
Start: 2017-06-22 | End: 2021-07-02 | Stop reason: ALTCHOICE

## 2017-06-22 NOTE — TELEPHONE ENCOUNTER
Requested Prescriptions     Signed Prescriptions Disp Refills    LORazepam (ATIVAN) 0.5 mg tablet 60 Tab 3     Sig: TAKE 1 TABLET BY MOUTH TWICE A DAY AS NEEDED FOR ANXIETY     Authorizing Provider: Loni Douglass     Ordering User: Asia Carvajal

## 2017-06-28 ENCOUNTER — TELEPHONE (OUTPATIENT)
Dept: CARDIOLOGY CLINIC | Age: 62
End: 2017-06-28

## 2017-06-28 NOTE — TELEPHONE ENCOUNTER
Returned patient's call, 2 pt identifiers used  Answered all questions pertaining to upcoming stress echo. No further questions.

## 2017-06-28 NOTE — TELEPHONE ENCOUNTER
Please call Mrs. Inderjit Richardson at [phone]. She has sent an email to Dr. Princess Mondragon however she'd like to be sure that someone's aware of the email and that someone will get back with her. Re:  Difficulty breathing has worsened over this past weekend.      Thank you, Cheryl Hood

## 2017-06-28 NOTE — TELEPHONE ENCOUNTER
Returned patient's call, 2 pt identifiers used  Patient has been having increased shortness of breath, known cad on ca score. Scheduled patient for a stress echo per ROBERTO Black. Pre testing instructions given to patient.     Future Appointments  Date Time Provider Felicita Hernández   7/6/2017 10:00 AM ECHO, [address] [facility]   7/6/2017 10:00 AM STRESSECHO, [address] [facility]   7/12/2017 9:40 AM Kia Garnica  [address]   10/4/2017 9:40 AM Kia Garnica  [address]

## 2017-07-06 ENCOUNTER — CLINICAL SUPPORT (OUTPATIENT)
Dept: CARDIOLOGY CLINIC | Age: 62
End: 2017-07-06

## 2017-07-06 ENCOUNTER — TELEPHONE (OUTPATIENT)
Dept: CARDIOLOGY CLINIC | Age: 62
End: 2017-07-06

## 2017-07-06 DIAGNOSIS — I25.10 CORONARY ARTERY DISEASE DUE TO LIPID RICH PLAQUE: ICD-10-CM

## 2017-07-06 DIAGNOSIS — R06.02 SHORTNESS OF BREATH: Primary | ICD-10-CM

## 2017-07-06 DIAGNOSIS — I25.83 CORONARY ARTERY DISEASE DUE TO LIPID RICH PLAQUE: ICD-10-CM

## 2017-07-12 ENCOUNTER — DOCUMENTATION ONLY (OUTPATIENT)
Dept: CARDIOLOGY CLINIC | Age: 62
End: 2017-07-12

## 2017-07-12 ENCOUNTER — OFFICE VISIT (OUTPATIENT)
Dept: CARDIOLOGY CLINIC | Age: 62
End: 2017-07-12

## 2017-07-12 VITALS
WEIGHT: 225.4 LBS | HEART RATE: 74 BPM | RESPIRATION RATE: 16 BRPM | SYSTOLIC BLOOD PRESSURE: 134 MMHG | HEIGHT: 69 IN | BODY MASS INDEX: 33.38 KG/M2 | DIASTOLIC BLOOD PRESSURE: 80 MMHG

## 2017-07-12 DIAGNOSIS — E78.5 HYPERLIPIDEMIA, UNSPECIFIED HYPERLIPIDEMIA TYPE: ICD-10-CM

## 2017-07-12 DIAGNOSIS — I47.1 PSVT (PAROXYSMAL SUPRAVENTRICULAR TACHYCARDIA) (HCC): ICD-10-CM

## 2017-07-12 DIAGNOSIS — E66.3 OVERWEIGHT (BMI 25.0-29.9): ICD-10-CM

## 2017-07-12 DIAGNOSIS — I10 ESSENTIAL HYPERTENSION: ICD-10-CM

## 2017-07-12 DIAGNOSIS — I25.10 ATHEROSCLEROSIS OF NATIVE CORONARY ARTERY OF NATIVE HEART WITHOUT ANGINA PECTORIS: ICD-10-CM

## 2017-07-12 DIAGNOSIS — I49.3 PVC (PREMATURE VENTRICULAR CONTRACTION): ICD-10-CM

## 2017-07-12 DIAGNOSIS — R06.02 SHORTNESS OF BREATH: Primary | ICD-10-CM

## 2017-07-12 RX ORDER — FAMOTIDINE 20 MG
20 TABLET ORAL 2 TIMES DAILY
COMMUNITY

## 2017-07-12 NOTE — PROGRESS NOTES
[place]:   [phone]    HPI: Kenney Jansen, a 57y.o. year-old who presents for follow up regarding her palpitations and HTN. She feels like she can't take a dep breath and still has a cough and congestion and like a heaviness with taking a deep breath. Wants to know my thoughts on that. Appears to have trouble with the deep breath. She feels like she has a sinus drainage and feels strangled at times, has a chronic cough. Continues to exercise ok and is not limited. Has some foot pain. Continues to have tightness and fluid in the ankles and it is worse at the end of the day an with sitting or standing. The PVC are really a big source of stress for her. She took a lasix and it made them worse. So will trial taking the lasix plus potassium as needed. She is very frustrated about her weight. Exercising regularly every day but worrying about her weight every day. Reviewed her labs, lipids look great, K is fine, lft ok. Discussed EP eval to see if they can ablate the PVC, but she is not     She has had all the PVC in 2015- stopped the lasix and they went away until 1/17. Started spironolactone in 2/17. She was having trouble getting a deep breath and that got better when she stopped it. Then 2/5 she started with heart racing and that is bothering her at night now. Has several a week now. Rare after lunch she will feel a pvc. After dinner she often has irregular heartbeats. BP is up as well. The deep breath stuff is getting worse. CXR clear but the congestion continues as well. Labs looked good in 2/16 none since. Discussed the options for palps and htn. Offered stopping the dyazide and starting amlodipine or retrial of spironolactone. She feels like anxiety is part of the problem   Wants to know if she can take a higher dose of ativan. Also discussed verapamil that she took before or bystolic.      She is on PPI for GERD and sees Dr. Dara Schmidt annually   LE edema minimal     -She gets her labs at [address] [place] [address]  -Prefers that our office follow her labs  -After her last visit we received labs on 3/8/18 from [address] [place] [address] dated 3/6/18  BMP ok, Mg 2.1, Vit D 43    Doing fine on the spironolactone and BP is great today. A/P:  1. CAD- calcium score of 250 2011, continue aspirin and pravastatin  2. Hyperlipidemia- LDL 83 in 2/16, on pravastatin 40mg daily and fish oil, LDL goal < 70, will check fasting lipids prior to her return visit  3. Overweight- Body mass index is 33.29 kg/(m^2). encouraged her to exercise  4. PSVT- palpitations well controlled now, could not tolerate Bystolic, previously discussed suppression medications and anti-anxiety medications, not taking Verapamil SR (had been on it in her 30s for SVT at 220bpm but then stopped by Dr. Joana Osler years later), now doing ok with the change in diuretic so potassium seems to be a culprit  -palpitations worsened by dyazide but for a while doing ok on dyazide/spironolactone/KCL combination, now palps good on spironolactone alone but   5. HTN- at goal now on amlodipine today and spironolactone  6. Elevated AST/ALT- resolved, will recheck prior to her return visit   7. Insulin Resistance-  continue diet and exercise   8. Vit D deficiency- on 1000 units daily   9. Hypokalemia - stable now, K 4.1  10. Edema - well controlled now on spironolactone and dyazide  11. GERD - on Prilosec, followed by GI/Dr. Shady Karimi    Stress echo 7/15 exe 9:00, EF 60%, no WMA  Echo 7/15 EF 65%, normal  Stress nuc 2009 nl EF 67%  Ca Score 2011 250    Fhx +CAD    She  has a past medical history of Calculus of kidney; Chronic venous insufficiency; DDD (degenerative disc disease); GERD (gastroesophageal reflux disease); H/O hiatal hernia; History of actinic keratosis; and Hypertension.     Cardiovascular ROS: no chest pain or dyspnea on exertion, positive for palpitations  Respiratory ROS: no cough, shortness of breath, or wheezing  Neurological ROS: no TIA or stroke symptoms  All other systems negative except as above. PE  Vitals:    07/12/17 0942   BP: 134/80   Pulse: 74   Resp: 16   Weight: 225 lb 6.4 oz (102.2 kg)   Height: 5' 9\" (1.753 m)    Body mass index is 33.29 kg/(m^2).   General appearance - alert, well appearing, and in no distress  Mental status - affect appropriate to mood  Eyes - sclera anicteric, moist mucous membranes  Neck - supple  Lymphatics - not assessed   Chest - clear to auscultation, no wheezes, rales or rhonchi  Heart - normal rate, regular rhythm, normal S1, S2, no murmurs, rubs, clicks or gallops  Abdomen - soft, nontender, nondistended  Back exam - full range of motion, no tenderness  Neurological - cranial nerves II through XII grossly intact, no focal deficit  Musculoskeletal - no muscular tenderness noted, normal strength  Extremities - peripheral pulses normal, trace LE edema  Skin - normal coloration  no rashes    12 lead ECG: NSR    Recent Labs:  Lab Results   Component Value Date/Time    Cholesterol, total 172 02/11/2016 10:27 AM    Cholesterol, Total 181 02/11/2016 10:27 AM    HDL Cholesterol 65 02/11/2016 10:27 AM    LDL, calculated 83 02/11/2016 10:27 AM    Triglyceride 118 02/11/2016 10:27 AM     Lab Results   Component Value Date/Time    Creatinine 0.80 02/11/2016 10:27 AM     Lab Results   Component Value Date/Time    BUN 13 02/11/2016 10:27 AM     Lab Results   Component Value Date/Time    Potassium 4.3 02/11/2016 10:27 AM     Lab Results   Component Value Date/Time    Hemoglobin A1c 5.4 02/11/2016 10:27 AM     No components found for: HGBI,  IHGB,  HGB,  HGBP,  WBHGB  Lab Results   Component Value Date/Time    PLATELET 216 51/58/2896 10:27 AM       Reviewed:  Past Medical History:   Diagnosis Date    Calculus of kidney     Chronic venous insufficiency     DDD (degenerative disc disease)     GERD (gastroesophageal reflux disease)     H/O hiatal hernia     gastritis    History of actinic keratosis     basal cell    Hypertension      History   Smoking Status    Never Smoker   Smokeless Tobacco    Never Used     History   Alcohol Use    0.0 oz/week    0 Standard drinks or equivalent per week     Comment: rarely     Allergies   Allergen Reactions    Boniva [Ibandronate] Other (comments)     Dysphagia      Carac [Fluorouracil] Cough and Other (comments)     Cogestion    Cozaar [Losartan] Shortness of Breath    Lisinopril Cough    Preservative Other (comments)     Preservative in eye drops make eyes red      Red Dye Hives    Sulfa (Sulfonamide Antibiotics) Rash       Current Outpatient Prescriptions   Medication Sig    famotidine (PEPCID) 20 mg tablet Take 20 mg by mouth. Taking when not taking the omeprazole    LORazepam (ATIVAN) 0.5 mg tablet TAKE 1 TABLET BY MOUTH TWICE A DAY AS NEEDED FOR ANXIETY    amLODIPine (NORVASC) 2.5 mg tablet Take 2 Tabs by mouth daily.  furosemide (LASIX) 20 mg tablet Take 1 Tab by mouth daily as needed.  spironolactone (ALDACTONE) 25 mg tablet Take 1 Tab by mouth daily.  OMEGA-3 FATTY ACIDS/FISH OIL (OMEGA 3 FISH OIL PO) Take  by mouth two (2) times a day. Indications: 1220 mg fish oil 360 mg omega-e    cholecalciferol (VITAMIN D3) 1,000 unit cap Take  by mouth daily.  pravastatin (PRAVACHOL) 40 mg tablet TAKE 1 TABLET BY MOUTH EVERY DAY    multivitamin (ONE A DAY) tablet Take 1 Tab by mouth daily.  cranberry extract-vit C (AZO CRANBERRY PLUS VIT C) 250-60 mg cap Take 1 Tab by mouth daily.  omeprazole (PRILOSEC) 20 mg capsule Take 20 mg by mouth. 1 capsule by mouth every 3 days and is in the process of weaning off    aspirin 81 mg tablet Take 162 mg by mouth daily.  KLOR-CON 8 8 mEq tablet TAKE 4 TABLETS BY MOUTH EVERY DAY     No current facility-administered medications for this visit.         [facility] [place]  [address], [address], [address] [place] [address]

## 2017-07-12 NOTE — MR AVS SNAPSHOT
Visit Information Date & Time Provider Department Dept. Phone Encounter #  
 7/12/2017  9:40 AM Ani Gaitan MD [facility] [phone] [id number] Your Appointments 10/4/2017  9:40 AM  
ESTABLISHED PATIENT with Ani Gaitan MD  
[facility] ([facility]) Appt Note: 4 month follow up  
 [address]  
[address] [phone] Upcoming Health Maintenance Date Due Hepatitis C Screening 1955 INFLUENZA AGE 9 TO ADULT 8/1/2017 BREAST CANCER SCRN MAMMOGRAM 6/6/2019 PAP AKA CERVICAL CYTOLOGY 5/23/2020 COLONOSCOPY 1/3/2024 DTaP/Tdap/Td series (2 - Td) 9/28/2025 Allergies as of 7/12/2017  Review Complete On: 7/12/2017 By: Wei Murrell Severity Noted Reaction Type Reactions Boniva [Ibandronate]  04/25/2012    Other (comments) Dysphagia Carac [Fluorouracil]  10/17/2012    Cough, Other (comments) Cogestion Cozaar [Losartan]  05/18/2012    Shortness of Breath Lisinopril  02/15/2012    Cough Preservative  04/18/2012    Other (comments) Preservative in eye drops make eyes red Red Dye  02/15/2012    Hives Sulfa (Sulfonamide Antibiotics)  02/15/2012    Rash Current Immunizations  Reviewed on 4/14/2014 Name Date Influenza Vaccine 10/1/2016, 10/10/2015, 10/8/2014, 10/14/2013 Influenza Vaccine Whole 9/17/2012 TD Vaccine 10/17/2003 Tdap 9/28/2015 Zoster Vaccine, Live 1/16/2016 Not reviewed this visit You Were Diagnosed With   
  
 Codes Comments Shortness of breath    -  Primary ICD-10-CM: R06.02 
ICD-9-CM: 786.05 Atherosclerosis of native coronary artery of native heart without angina pectoris     ICD-10-CM: I25.10 ICD-9-CM: 414.01 Hyperlipidemia, unspecified hyperlipidemia type     ICD-10-CM: E78.5 ICD-9-CM: 272.4 PSVT (paroxysmal supraventricular tachycardia) ([facility])     ICD-10-CM: I47.1 ICD-9-CM: 427.0 Essential hypertension     ICD-10-CM: I10 
ICD-9-CM: 401.9 Overweight (BMI 25.0-29. 9)     ICD-10-CM: O40.1 ICD-9-CM: 278.02   
 PVC (premature ventricular contraction)     ICD-10-CM: I49.3 ICD-9-CM: 427.69 Vitals BP Pulse Resp Height(growth percentile) Weight(growth percentile) BMI  
 134/80 (BP 1 Location: Left arm, BP Patient Position: Sitting) 74 16 5' 9\" (1.753 m) 225 lb 6.4 oz (102.2 kg) 33.29 kg/m2 OB Status Smoking Status Postmenopausal Never Smoker Vitals History BMI and BSA Data Body Mass Index Body Surface Area  
 33.29 kg/m 2 2.23 m 2 Preferred Pharmacy Pharmacy Name Phone [address], [address] [facility] [address] [phone] Your Updated Medication List  
  
   
This list is accurate as of: 7/12/17 10:16 AM.  Always use your most recent med list. amLODIPine 2.5 mg tablet Commonly known as:  Edgardo Cordial Take 2 Tabs by mouth daily. aspirin 81 mg tablet Take 162 mg by mouth daily. cranberry extract-vitamin C 250-60 mg Cap Commonly known as:  AZO CRANBERRY PLUS VIT C Take 1 Tab by mouth daily. furosemide 20 mg tablet Commonly known as:  LASIX Take 1 Tab by mouth daily as needed. KLOR-CON 8 8 mEq tablet Generic drug:  potassium chloride SR  
TAKE 4 TABLETS BY MOUTH EVERY DAY  
  
 LORazepam 0.5 mg tablet Commonly known as:  ATIVAN  
TAKE 1 TABLET BY MOUTH TWICE A DAY AS NEEDED FOR ANXIETY  
  
 multivitamin tablet Commonly known as:  ONE A DAY Take 1 Tab by mouth daily. OMEGA 3 FISH OIL PO Take  by mouth two (2) times a day. Indications: 1220 mg fish oil 360 mg omega-e  
  
 omeprazole 20 mg capsule Commonly known as:  PRILOSEC Take 20 mg by mouth. 1 capsule by mouth every 3 days and is in the process of weaning off PEPCID 20 mg tablet Generic drug:  famotidine Take 20 mg by mouth. Taking when not taking the omeprazole  
  
 pravastatin 40 mg tablet Commonly known as:  PRAVACHOL  
TAKE 1 TABLET BY MOUTH EVERY DAY  
  
 spironolactone 25 mg tablet Commonly known as:  ALDACTONE Take 1 Tab by mouth daily. VITAMIN D3 1,000 unit Cap Generic drug:  cholecalciferol Take  by mouth daily. We Performed the Following REFERRAL TO PULMONARY DISEASE [MYJ47 Custom] Comments:  
 Please evaluate patient for shortness of breath. Referral Information Referral ID Referred By Referred To  
  
 [id number] [address] [place] [address], [address] 40 [facility] 101 [facility], 40 [facility] Visits Status Start Date End Date 1 New Request 7/12/17 7/12/18 If your referral has a status of pending review or denied, additional information will be sent to support the outcome of this decision. Patient Instructions Please take your lasix as needed, and when you take it take it with two potassium pills. Introducing [facility]! Dear William Mike: Thank you for requesting a [employer] account. Our records indicate that you already have an active [employer] account. You can access your account anytime at [email or web address] Did you know that you can access your hospital and ER discharge instructions at any time in [employer]? You can also review all of your test results from your hospital stay or ER visit. Additional Information If you have questions, please visit the Frequently Asked Questions section of the [employer] website at [email or web address] Remember, [employer] is NOT to be used for urgent needs. For medical emergencies, dial 911. Now available from your iPhone and Android! Please provide this summary of care documentation to your next provider. Your primary care clinician is listed as Mariel Huynh. If you have any questions after today's visit, please call [phone].

## 2017-07-12 NOTE — PROGRESS NOTES
Appointment scheduled for patient to see Dr. Keila Moore on Monday 8/21/1/ @ 8:30 am. She has also been placed on the wait list for a sooner appointment. Date/time/location provided to patient.

## 2017-08-01 ENCOUNTER — HOSPITAL ENCOUNTER (OUTPATIENT)
Dept: GENERAL RADIOLOGY | Age: 62
Discharge: HOME | End: 2017-08-01
Payer: COMMERCIAL

## 2017-08-01 DIAGNOSIS — R06.02 SHORTNESS OF BREATH: ICD-10-CM

## 2017-08-01 PROCEDURE — 71020 XR CHEST PA LAT: CPT

## 2017-08-10 RX ORDER — FUROSEMIDE 20 MG/1
TABLET ORAL
Qty: 30 TAB | Refills: 0 | Status: SHIPPED | OUTPATIENT
Start: 2017-08-10 | End: 2017-09-08 | Stop reason: SDUPTHER

## 2017-09-08 RX ORDER — FUROSEMIDE 20 MG/1
20 TABLET ORAL
Qty: 30 TAB | Refills: 8 | Status: SHIPPED | OUTPATIENT
Start: 2017-09-08 | End: 2021-06-24 | Stop reason: SDUPTHER

## 2017-09-08 NOTE — TELEPHONE ENCOUNTER
From: Herman Atkinson  To: Maik Eckert MD  Sent: 9/8/2017 10:52 AM EDT  Subject: Medication Renewal Request    Original authorizing provider: Maik Eckert MD    Major Romp.  Mildredjeanette Phan would like a refill of the following medications:  furosemide (LASIX) 20 mg tablet Maik Eckert MD]    Preferred pharmacy: [address] [place] [address],[address]  AT [place] [address]    Comment:

## 2017-09-13 ENCOUNTER — TELEPHONE (OUTPATIENT)
Dept: CARDIOLOGY CLINIC | Age: 62
End: 2017-09-13

## 2017-09-13 DIAGNOSIS — I10 ESSENTIAL HYPERTENSION: ICD-10-CM

## 2017-09-13 DIAGNOSIS — I25.10 ATHEROSCLEROSIS OF NATIVE CORONARY ARTERY OF NATIVE HEART WITHOUT ANGINA PECTORIS: Primary | ICD-10-CM

## 2017-09-13 NOTE — TELEPHONE ENCOUNTER
Patient would like an updated lab order sent to her home address for November 2017 appt. [address] [address] [address]

## 2017-09-14 NOTE — TELEPHONE ENCOUNTER
She had labs in 5/17 so the only labs I would recheck would be her BMP, mag and vit D. Please ask her if there was anything else that she wanted checked. She only needs lipids once/year since they looked good in 5/17.

## 2017-09-14 NOTE — TELEPHONE ENCOUNTER
Returned patient's call, 2 pt identifiers used  Per NP [facility] the following message given:    She had labs in 5/17 so the only labs I would recheck would be her BMP, mag and vit D. Please ask her if there was anything else that she wanted checked. She only needs lipids once/year since they looked good in 5/17. No other labs requested by patient and new lab slip mailed out to patient.

## 2017-09-28 DIAGNOSIS — E55.9 VITAMIN D DEFICIENCY: ICD-10-CM

## 2017-09-28 DIAGNOSIS — I25.10 ATHEROSCLEROSIS OF NATIVE CORONARY ARTERY OF NATIVE HEART WITHOUT ANGINA PECTORIS: Primary | ICD-10-CM

## 2017-09-28 DIAGNOSIS — I10 ESSENTIAL HYPERTENSION: ICD-10-CM

## 2018-01-02 ENCOUNTER — PATIENT MESSAGE (OUTPATIENT)
Dept: CARDIOLOGY CLINIC | Age: 63
End: 2018-01-02

## 2018-01-03 RX ORDER — POTASSIUM CHLORIDE 600 MG/1
16 TABLET, FILM COATED, EXTENDED RELEASE ORAL DAILY
Qty: 180 TAB | Refills: 1 | Status: SHIPPED | OUTPATIENT
Start: 2018-01-03 | End: 2018-03-26 | Stop reason: SDUPTHER

## 2018-01-08 RX ORDER — SPIRONOLACTONE 25 MG/1
TABLET ORAL
Qty: 90 TAB | Refills: 0 | Status: SHIPPED | OUTPATIENT
Start: 2018-01-08 | End: 2018-03-01 | Stop reason: SDUPTHER

## 2018-03-14 ENCOUNTER — TELEPHONE (OUTPATIENT)
Dept: CARDIOLOGY CLINIC | Age: 63
End: 2018-03-14

## 2018-03-14 ENCOUNTER — DOCUMENTATION ONLY (OUTPATIENT)
Dept: CARDIOLOGY CLINIC | Age: 63
End: 2018-03-14

## 2018-03-14 DIAGNOSIS — I10 HYPERTENSION, UNSPECIFIED TYPE: ICD-10-CM

## 2018-03-14 DIAGNOSIS — I47.1 PSVT (PAROXYSMAL SUPRAVENTRICULAR TACHYCARDIA) (HCC): Primary | ICD-10-CM

## 2018-03-14 DIAGNOSIS — I25.10 CORONARY ARTERY DISEASE INVOLVING NATIVE CORONARY ARTERY OF NATIVE HEART, ANGINA PRESENCE UNSPECIFIED: ICD-10-CM

## 2018-03-14 NOTE — TELEPHONE ENCOUNTER
Called patient. Verified identity. Informed her of the following lab results per [facility]. Patient request a lab slip for Lipids and thyroid panel before her next visit in June. Will advise Namrata Leger. ROBERTO for further instruction. Informed patient that if she is agreeable I will mail the slip to her.

## 2018-03-19 DIAGNOSIS — I10 ESSENTIAL HYPERTENSION: ICD-10-CM

## 2018-03-19 DIAGNOSIS — I47.1 PAROXYSMAL SUPRAVENTRICULAR TACHYCARDIA (HCC): ICD-10-CM

## 2018-03-19 DIAGNOSIS — I49.3 PVC (PREMATURE VENTRICULAR CONTRACTION): Primary | ICD-10-CM

## 2018-03-19 DIAGNOSIS — E78.5 HYPERLIPIDEMIA, UNSPECIFIED HYPERLIPIDEMIA TYPE: Primary | ICD-10-CM

## 2018-03-19 DIAGNOSIS — I10 ESSENTIAL HYPERTENSION, MALIGNANT: ICD-10-CM

## 2018-03-19 DIAGNOSIS — I25.10 ATHEROSCLEROSIS OF NATIVE CORONARY ARTERY OF NATIVE HEART WITHOUT ANGINA PECTORIS: ICD-10-CM

## 2018-03-19 DIAGNOSIS — I47.1 PSVT (PAROXYSMAL SUPRAVENTRICULAR TACHYCARDIA) (HCC): ICD-10-CM

## 2018-03-19 DIAGNOSIS — E78.5 HYPERLIPIDEMIA, UNSPECIFIED HYPERLIPIDEMIA TYPE: ICD-10-CM

## 2018-03-19 NOTE — LETTER
5/31/2018 11:27 AM 
 
Ms[phone] Dear Justyna Situ: 
 
Please find your most recent results below. Resulted Orders NMR LIPOPROFILE Result Value Ref Range LDL-P 978 <1000 nmol/L Comment: Low                   < 1000 Moderate         1000 - 1299 Borderline-High  1300 - 1599 High             1600 - 2000 Very High             > 2000 LDL-C 67 0 - 99 mg/dL Comment:  
                             Optimal               <  100 Above optimal     100 -  129 Borderline        130 -  159 High              160 -  189 Very high             >  189 LDL-C is inaccurate if patient is non-fasting. HDL-C 51 >39 mg/dL Triglycerides 100 0 - 149 mg/dL Cholesterol, Total 138 100 - 199 mg/dL HDL-P (Total) 40.7 >=30.5 umol/L Small LDL-P 375 <=527 nmol/L  
 LDL size 20.6 >20.5 nm  
   Comment:  
    ---------------------------------------------------------- 
                 ** INTERPRETATIVE INFORMATION** PARTICLE CONCENTRATION AND SIZE 
                    <--Lower CVD Risk   Higher CVD Risk--> 
  LDL AND HDL PARTICLES   Percentile in Reference Population HDL-P (total)        High     75th    50th    25th   Low 
                       >34.9    34.9    30.5    26.7   <26.7 Small LDL-P          Low      25th    50th    75th   High 
                       <117     117     527     839    >839 LDL Size   <-Large (Pattern A)->    <-Small (Pattern B)-> 
                    23.0    20.6           20.5      19.0 
 ---------------------------------------------------------- Small LDL-P and LDL Size are associated with CVD risk, but not after LDL-P is taken into account. These assays were developed and their performance characteristics 
determined by [employer]. These assays have not been cleared by the 
[employer] and Drug Administration. The clinical utility o 
f these 
laboratory values have not been fully established. LP-IR SCORE 42 <=45 Comment: INSULIN RESISTANCE MARKER 
    <--Insulin Sensitive    Insulin Resistant--> Percentile in Reference Population Insulin Resistance Score LP-IR Score   Low   25th   50th   75th   High 
              <27   27     45     63     >63 LP-IR Score is inaccurate if patient is non-fasting. The LP-IR score is a laboratory developed index that has been 
associated with insulin resistance and diabetes risk and should be 
used as one component of a physician's clinical assessment. The 
LP-IR score listed above has not been cleared by the [employer] and 
Drug Administration. Narrative Performed at:  [address]  [id number] : Abdulkadir Hagen MD, Phone:  [phone] HEPATIC FUNCTION PANEL Result Value Ref Range Protein, total 6.9 6.0 - 8.5 g/dL Albumin 4.6 3.6 - 4.8 g/dL Bilirubin, total 0.6 0.0 - 1.2 mg/dL Bilirubin, direct 0.19 0.00 - 0.40 mg/dL Alk. phosphatase 67 39 - 117 IU/L  
 AST (SGOT) 29 0 - 40 IU/L  
 ALT (SGPT) 29 0 - 32 IU/L Narrative Performed at:  [address]  [id number] : Abdulkadir Hagen MD, Phone:  [phone] TSH REFLEX TO T4 Result Value Ref Range TSH 1.350 0.450 - 4.500 uIU/mL Narrative Performed at:  [address]  [id number] : Abdulkadir Hagen MD, Phone:  [phone] T4 (THYROXINE) Result Value Ref Range T4, Total 8.2 4.5 - 12.0 ug/dL Narrative Performed at:  [address]  [id number] : Telly Reilly MD, Phone:  [phone] RECOMMENDATIONS: 
Labs look great no changes. Please call me if you have any questions: [phone] Sincerely, Lena Charles MD

## 2018-03-21 ENCOUNTER — HOSPITAL ENCOUNTER (OUTPATIENT)
Dept: LAB | Age: 63
Discharge: HOME | End: 2018-03-21
Payer: MEDICARE

## 2018-03-21 ENCOUNTER — OFFICE VISIT (OUTPATIENT)
Dept: INTERNAL MEDICINE CLINIC | Age: 63
End: 2018-03-21

## 2018-03-21 VITALS
OXYGEN SATURATION: 98 % | BODY MASS INDEX: 34.8 KG/M2 | WEIGHT: 235 LBS | RESPIRATION RATE: 16 BRPM | DIASTOLIC BLOOD PRESSURE: 80 MMHG | SYSTOLIC BLOOD PRESSURE: 150 MMHG | HEART RATE: 88 BPM | TEMPERATURE: 99 F | HEIGHT: 69 IN

## 2018-03-21 DIAGNOSIS — Z23 ENCOUNTER FOR IMMUNIZATION: ICD-10-CM

## 2018-03-21 DIAGNOSIS — Z00.00 WELL ADULT EXAM: ICD-10-CM

## 2018-03-21 DIAGNOSIS — Z13.820 OSTEOPOROSIS SCREENING: ICD-10-CM

## 2018-03-21 DIAGNOSIS — R31.21 ASYMPTOMATIC MICROSCOPIC HEMATURIA: ICD-10-CM

## 2018-03-21 DIAGNOSIS — Z11.59 ENCOUNTER FOR HEPATITIS C SCREENING TEST FOR LOW RISK PATIENT: ICD-10-CM

## 2018-03-21 DIAGNOSIS — Z78.0 MENOPAUSE: ICD-10-CM

## 2018-03-21 DIAGNOSIS — R35.0 FREQUENCY OF URINATION: Primary | ICD-10-CM

## 2018-03-21 LAB
BILIRUB UR QL STRIP: NEGATIVE
GLUCOSE UR-MCNC: NEGATIVE MG/DL
KETONES P FAST UR STRIP-MCNC: NEGATIVE MG/DL
PH UR STRIP: 5.5 [PH] (ref 4.6–8)
PROT UR QL STRIP: NEGATIVE
SP GR UR STRIP: 1.02 (ref 1–1.03)
UA UROBILINOGEN AMB POC: NORMAL (ref 0.2–1)
URINALYSIS CLARITY POC: CLEAR
URINALYSIS COLOR POC: YELLOW
URINE BLOOD POC: NORMAL
URINE LEUKOCYTES POC: NEGATIVE
URINE NITRITES POC: NEGATIVE

## 2018-03-21 PROCEDURE — 87086 URINE CULTURE/COLONY COUNT: CPT

## 2018-03-21 NOTE — PROGRESS NOTES
Subjective:     Chief Complaint   Patient presents with    Urinary Frequency     at night     She  is a 70y.o. year old female who presents for evaluation. Takes Cranberry pills to prevent UTI. Has developed urinary frequency for the past 3 weeks and feels strange when she first starts urinating. No fever or back pain. Has had some breathing exercises and saw Pulmonologist and everything checked out. Saw Allergist and some allergies noted. Uses inhalers, etc.  Has a cough that comes and goes. Historical Data    Past Medical History:   Diagnosis Date    Calculus of kidney     Chronic venous insufficiency     DDD (degenerative disc disease)     GERD (gastroesophageal reflux disease)     H/O hiatal hernia     gastritis    History of actinic keratosis     basal cell    Hypertension         Past Surgical History:   Procedure Laterality Date    ENDOSCOPY, COLON, DIAGNOSTIC  5/2003    (Brand)-due 2013    HX DILATION AND CURETTAGE  1990's    HX HEENT      T&A    HX HEENT      left eyelid surg. Outpatient Encounter Prescriptions as of 3/21/2018   Medication Sig Dispense Refill    spironolactone (ALDACTONE) 25 mg tablet Take 1 Tab by mouth daily. 90 Tab 1    pravastatin (PRAVACHOL) 40 mg tablet TAKE ONE TABLET BY MOUTH DAILY **YELLOW OBLONG TABLET** 90 Tab 3    famotidine (PEPCID) 20 mg tablet Take 20 mg by mouth. Taking when not taking the omeprazole      amLODIPine (NORVASC) 2.5 mg tablet Take 2 Tabs by mouth daily. 60 Tab 11    OMEGA-3 FATTY ACIDS/FISH OIL (OMEGA 3 FISH OIL PO) Take  by mouth two (2) times a day. Indications: 1220 mg fish oil 360 mg omega-e      cholecalciferol (VITAMIN D3) 1,000 unit cap Take  by mouth daily.  multivitamin (ONE A DAY) tablet Take 1 Tab by mouth daily.  cranberry extract-vit C (AZO CRANBERRY PLUS VIT C) 250-60 mg cap Take 1 Tab by mouth daily. 30 Tab 0    aspirin 81 mg tablet Take 162 mg by mouth daily.       potassium chloride SR (KLOR-CON 8) 8 mEq tablet Take 2 Tabs by mouth daily. 180 Tab 1    furosemide (LASIX) 20 mg tablet TAKE 1 TABLET BY MOUTH DAILY AS NEEDED 90 Tab 3    furosemide (LASIX) 20 mg tablet Take 1 Tab by mouth daily as needed. 30 Tab 8    LORazepam (ATIVAN) 0.5 mg tablet TAKE 1 TABLET BY MOUTH TWICE A DAY AS NEEDED FOR ANXIETY 60 Tab 3    omeprazole (PRILOSEC) 20 mg capsule Take 20 mg by mouth. 1 capsule by mouth every 3 days and is in the process of weaning off       No facility-administered encounter medications on file as of 3/21/2018. Allergies   Allergen Reactions    Boniva [Ibandronate] Other (comments)     Dysphagia      Carac [Fluorouracil] Cough and Other (comments)     Cogestion    Cozaar [Losartan] Shortness of Breath    Lisinopril Cough    Preservative Other (comments)     Preservative in eye drops make eyes red      Red Dye Hives    Sulfa (Sulfonamide Antibiotics) Rash        Social History     Social History    Marital status:      Spouse name: N/A    Number of children: N/A    Years of education: N/A     Occupational History    Not on file. Social History Main Topics    Smoking status: Never Smoker    Smokeless tobacco: Never Used    Alcohol use 0.0 oz/week     0 Standard drinks or equivalent per week      Comment: rarely    Drug use: No    Sexual activity: Yes     Partners: Male     Other Topics Concern    Not on file     Social History Narrative        Review of Systems  Pertinent items are noted in HPI. Objective:     Vitals:    03/21/18 1528   BP: 150/80   Pulse: 88   Resp: 16   Temp: 99 °F (37.2 °C)   TempSrc: Oral   SpO2: 98%   Weight: 235 lb (106.6 kg)   Height: 5' 9\" (1.753 m)     Pleasant WF in no acute distress. Ears: TM's intact. Nose: Some erythema  Throat: Clear. Lungs: Clear to auscultation. Back: No CVA tenderness. ASSESSMENT / PLAN:   1.  Frequency of urination  · Treat if infection, refer if not  - AMB POC URINALYSIS DIP STICK AUTO W/O MICRO  - REFERRAL TO UROLOGY  - CULTURE, URINE    2. Asymptomatic microscopic hematuria  · See Urologist if no infection.  - REFERRAL TO UROLOGY    3. Encounter for immunization    - pneumococcal 13 dante conj dip (PREVNAR-13) 0.5 mL syrg injection; 0.5 mL by IntraMUSCular route once for 1 dose. Dispense: 0.5 mL; Refill: 0    4. Osteoporosis screening    - DEXA BONE DENSITY STUDY AXIAL; Future    5. Menopause    - DEXA BONE DENSITY STUDY AXIAL; Future    6. Encounter for hepatitis C screening test for low risk patient    - HCV AB W/RFLX TO KENDRICK; Future    7. Well adult exam    - CBC WITH AUTOMATED DIFF; Future  - LIPID PANEL; Future  - TSH REFLEX TO T4; Future  - URINALYSIS W/ RFLX MICROSCOPIC; Future  - METABOLIC PANEL, COMPREHENSIVE; Future    Patient Instructions   Drink plenty of fluids. Continue your current medications. See Urologist if urine culture negative (due to trace of blood in urine). Have a Prevnar vaccine at your pharmacy. Have fasting blood work analysis. Follow-up Disposition:  Return in about 4 months (around 7/21/2018) for Medication re-evaluation. Advised her to call back or return to office if symptoms worsen/change/persist.  Discussed expected course/resolution/complications of diagnosis in detail with patient. Medication risks/benefits/costs/interactions/alternatives discussed with patient. She was given an after visit summary which includes diagnoses, current medications, & vitals. She expressed understanding with the diagnosis and plan.

## 2018-03-21 NOTE — PROGRESS NOTES
Chief Complaint   Patient presents with    Urinary Frequency     at night     Reviewed record in preparation for visit and have obtained necessary documentation. Identified pt with two pt identifiers(name and ). Health Maintenance Due   Topic    Hepatitis C Screening     GLAUCOMA SCREENING Q2Y     Bone Densitometry (Dexa) Screening     Pneumococcal 65+ Low/Medium Risk (1 of 2 - PCV13)         Chief Complaint   Patient presents with    Urinary Frequency     at night        Wt Readings from Last 3 Encounters:   18 235 lb (106.6 kg)   17 225 lb 6.4 oz (102.2 kg)   17 224 lb (101.6 kg)     Temp Readings from Last 3 Encounters:   18 99 °F (37.2 °C) (Oral)   17 99.6 °F (37.6 °C) (Oral)   16 98.4 °F (36.9 °C) (Oral)     BP Readings from Last 3 Encounters:   18 150/80   17 134/80   17 150/80     Pulse Readings from Last 3 Encounters:   18 88   17 74   17 69           Learning Assessment:  :     Learning Assessment 6/10/2015 2014 10/17/2012   PRIMARY LEARNER Patient Patient Patient   HIGHEST LEVEL OF EDUCATION - PRIMARY LEARNER  SOME COLLEGE SOME COLLEGE -   BARRIERS PRIMARY LEARNER NONE NONE -   CO-LEARNER CAREGIVER No No -   PRIMARY LANGUAGE ENGLISH ENGLISH ENGLISH    NEED No No -   LEARNER PREFERENCE PRIMARY DEMONSTRATION OTHER (COMMENT) VIDEOS   LEARNING SPECIAL TOPICS no no -   ANSWERED BY patient patient patient   RELATIONSHIP SELF SELF SELF   ASSESSMENT COMMENT none none -       Depression Screening:  :     PHQ over the last two weeks 3/9/2015   Little interest or pleasure in doing things Not at all   Feeling down, depressed or hopeless Not at all   Total Score PHQ 2 0       Fall Risk Assessment:  :     Fall Risk Assessment, last 12 mths 3/21/2018   Able to walk? Yes       Abuse Screening:  :     Abuse Screening Questionnaire 6/10/2015 2014   Do you ever feel afraid of your partner?  N N   Are you in a relationship with someone who physically or mentally threatens you? N N   Is it safe for you to go home? Y Y       Coordination of Care Questionnaire:  :     1) Have you been to an emergency room, urgent care clinic since your last visit? no   Hospitalized since your last visit? no             2) Have you seen or consulted any other health care providers outside of [address] [place] [address] since your last visit? yes  (Include any pap smears or colon screenings in this section.)    3) Do you have an Advance Directive on file? no    4) Are you interested in receiving information on Advance Directives? NO      Patient is accompanied by self I have received verbal consent from Michelle Stiles to discuss any/all medical information while they are present in the room. Reviewed record  In preparation for visit and have obtained necessary documentation.

## 2018-03-21 NOTE — PATIENT INSTRUCTIONS
Drink plenty of fluids. Continue your current medications. See Urologist if urine culture negative (due to trace of blood in urine). Have a Prevnar vaccine at your pharmacy. Have fasting blood work analysis.

## 2018-03-21 NOTE — MR AVS SNAPSHOT
[address] [facility], [address] [facility] 
[phone] Patient: Jorgito Smith MRN: [id number] [id number] Visit Information Date & Time Provider Department Dept. Phone Encounter #  
 3/21/2018  3:30 PM [phone] Follow-up Instructions Return in about 4 months (around 7/21/2018) for Medication re-evaluation. Your Appointments 6/1/2018 10:40 AM  
ESTABLISHED PATIENT with Balbir Santiago MD  
[facility] ([address] [facility]) Appt Note: annual [facility] [address] [place]  
[address] [place] [address] Upcoming Health Maintenance Date Due Hepatitis C Screening 1955 GLAUCOMA SCREENING Q2Y 1/29/2018 Bone Densitometry (Dexa) Screening 1/29/2018 Pneumococcal 65+ Low/Medium Risk (1 of 2 - PCV13) 1/29/2018 BREAST CANCER SCRN MAMMOGRAM 6/6/2019 COLONOSCOPY 1/3/2024 DTaP/Tdap/Td series (2 - Td) 9/28/2025 Allergies as of 3/21/2018  Review Complete On: 3/21/2018 By: Marva Friedman MD  
  
 Severity Noted Reaction Type Reactions Boniva [Ibandronate]  04/25/2012    Other (comments) Dysphagia Carac [Fluorouracil]  10/17/2012    Cough, Other (comments) Cogestion Cozaar [Losartan]  05/18/2012    Shortness of Breath Lisinopril  02/15/2012    Cough Preservative  04/18/2012    Other (comments) Preservative in eye drops make eyes red Red Dye  02/15/2012    Hives Sulfa (Sulfonamide Antibiotics)  02/15/2012    Rash Current Immunizations  Reviewed on 4/14/2014 Name Date Influenza Vaccine 10/1/2016, 10/10/2015, 10/8/2014, 10/14/2013 Influenza Vaccine Whole 9/17/2012 TD Vaccine 10/17/2003 Tdap 9/28/2015 Zoster Vaccine, Live 1/16/2016 Not reviewed this visit You Were Diagnosed With   
  
 Codes Comments Frequency of urination    -  Primary ICD-10-CM: R35.0 ICD-9-CM: 788.41 Asymptomatic microscopic hematuria     ICD-10-CM: R31.21 
ICD-9-CM: 599.72 Encounter for immunization     ICD-10-CM: H07 ICD-9-CM: V03.89 Osteoporosis screening     ICD-10-CM: Z13.820 ICD-9-CM: V82.81 Menopause     ICD-10-CM: Z78.0 ICD-9-CM: 627.2 Encounter for hepatitis C screening test for low risk patient     ICD-10-CM: Z11.59 
ICD-9-CM: V73.89 Well adult exam     ICD-10-CM: Z00.00 ICD-9-CM: V70.0 Vitals BP Pulse Temp Resp Height(growth percentile) Weight(growth percentile) 150/80 (BP 1 Location: Left arm, BP Patient Position: Sitting) 88 99 °F (37.2 °C) (Oral) 16 5' 9\" (1.753 m) 235 lb (106.6 kg) SpO2 BMI OB Status Smoking Status 98% 34.7 kg/m2 Postmenopausal Never Smoker BMI and BSA Data Body Mass Index Body Surface Area 34.7 kg/m 2 2.28 m 2 Preferred Pharmacy Pharmacy Name Phone [address], [facility] [address] [phone] Your Updated Medication List  
  
   
This list is accurate as of 3/21/18  3:53 PM.  Always use your most recent med list. amLODIPine 2.5 mg tablet Commonly known as:  Bosie Shield Take 2 Tabs by mouth daily. aspirin 81 mg tablet Take 162 mg by mouth daily. cranberry extract-vitamin C 250-60 mg Cap Commonly known as:  AZO CRANBERRY PLUS VIT C Take 1 Tab by mouth daily. * furosemide 20 mg tablet Commonly known as:  LASIX TAKE 1 TABLET BY MOUTH DAILY AS NEEDED * furosemide 20 mg tablet Commonly known as:  LASIX Take 1 Tab by mouth daily as needed. LORazepam 0.5 mg tablet Commonly known as:  ATIVAN  
TAKE 1 TABLET BY MOUTH TWICE A DAY AS NEEDED FOR ANXIETY  
  
 multivitamin tablet Commonly known as:  ONE A DAY Take 1 Tab by mouth daily. OMEGA 3 FISH OIL PO Take  by mouth two (2) times a day. Indications: 1220 mg fish oil 360 mg omega-e  
  
 omeprazole 20 mg capsule Commonly known as:  PRILOSEC Take 20 mg by mouth. 1 capsule by mouth every 3 days and is in the process of weaning off PEPCID 20 mg tablet Generic drug:  famotidine Take 20 mg by mouth. Taking when not taking the omeprazole  
  
 pneumococcal 13 dante conj dip 0.5 mL Syrg injection Commonly known as:  PREVNAR-13  
0.5 mL by IntraMUSCular route once for 1 dose. potassium chloride SR 8 mEq tablet Commonly known as:  KLOR-CON 8 Take 2 Tabs by mouth daily. pravastatin 40 mg tablet Commonly known as:  PRAVACHOL  
TAKE ONE TABLET BY MOUTH DAILY **YELLOW OBLONG TABLET**  
  
 spironolactone 25 mg tablet Commonly known as:  ALDACTONE Take 1 Tab by mouth daily. VITAMIN D3 1,000 unit Cap Generic drug:  cholecalciferol Take  by mouth daily. * Notice: This list has 2 medication(s) that are the same as other medications prescribed for you. Read the directions carefully, and ask your doctor or other care provider to review them with you. Prescriptions Printed Refills  
 pneumococcal 13 dante conj dip (PREVNAR-13) 0.5 mL syrg injection 0 Si.5 mL by IntraMUSCular route once for 1 dose. Class: Print Route: IntraMUSCular We Performed the Following AMB POC URINALYSIS DIP STICK AUTO W/O MICRO [41986 CPT(R)] CULTURE, URINE [id number] CPT(R)] REFERRAL TO UROLOGY [PYM128 Custom] Comments:  
 Please evaluate patient for frequency and hematuria Follow-up Instructions Return in about 4 months (around 2018) for Medication re-evaluation. To-Do List   
 2018 Lab:  CBC WITH AUTOMATED DIFF   
  
 2018 Imaging:  DEXA BONE DENSITY STUDY AXIAL   
  
 2018 Lab:  HCV AB W/RFLX TO KENDRICK   
  
 2018 Lab:  LIPID PANEL   
  
 2018 Lab: METABOLIC PANEL, COMPREHENSIVE   
  
 03/22/2018 Lab:  TSH REFLEX TO T4   
  
 03/22/2018 Lab:  URINALYSIS W/ RFLX MICROSCOPIC Referral Information Referral ID Referred By Referred To  
  
 [id number] [facility] [address]   
   [place], [address] Visits Status Start Date End Date 1 New Request 3/21/18 3/21/19 If your referral has a status of pending review or denied, additional information will be sent to support the outcome of this decision. Patient Instructions Drink plenty of fluids. Continue your current medications. See Urologist if urine culture negative (due to trace of blood in urine). Have a Prevnar vaccine at your pharmacy. Have fasting blood work analysis. Introducing [facility] & [facility] SERVICES! Dear James Stark: Thank you for requesting a [employer] account. Our records indicate that you already have an active [employer] account. You can access your account anytime at [email or web address] Did you know that you can access your hospital and ER discharge instructions at any time in [employer]? You can also review all of your test results from your hospital stay or ER visit. Additional Information If you have questions, please visit the Frequently Asked Questions section of the [employer] website at [email or web address] Remember, [employer] is NOT to be used for urgent needs. For medical emergencies, dial 911. Now available from your iPhone and Android! Please provide this summary of care documentation to your next provider. Your primary care clinician is listed as Socorro Moran. If you have any questions after today's visit, please call [phone].

## 2018-03-23 ENCOUNTER — PATIENT MESSAGE (OUTPATIENT)
Dept: INTERNAL MEDICINE CLINIC | Age: 63
End: 2018-03-23

## 2018-03-23 LAB — BACTERIA UR CULT: NORMAL

## 2018-03-23 NOTE — PROGRESS NOTES
Please report that her urine culture was negative and that she should go ahead and make Urology appointment (for blood in urine).

## 2018-03-26 ENCOUNTER — PATIENT MESSAGE (OUTPATIENT)
Dept: INTERNAL MEDICINE CLINIC | Age: 63
End: 2018-03-26

## 2018-03-26 DIAGNOSIS — Z11.59 ENCOUNTER FOR HEPATITIS C SCREENING TEST FOR LOW RISK PATIENT: ICD-10-CM

## 2018-03-26 DIAGNOSIS — E87.6 HYPOKALEMIA: Primary | ICD-10-CM

## 2018-03-26 RX ORDER — POTASSIUM CHLORIDE 600 MG/1
TABLET, FILM COATED, EXTENDED RELEASE ORAL
Qty: 60 TAB | Refills: 1
Start: 2018-03-26 | End: 2021-06-24 | Stop reason: SDUPTHER

## 2018-03-26 NOTE — TELEPHONE ENCOUNTER
From: Lizeth Pires  To: Caroline Johnson MD  Sent: 3/23/2018 2:56 PM EDT  Subject: Update Medical Information    I noticed that Furosemide 20 mg tablet is listed on my chart twice. There is a note on chart that says: \"This list has 2 medications that are the same as other medications prescribed for you. Read the directions carefully and ask your doctor to review them with you. \" Please remove one of these Furosemide 20 mg tablet. They have been duplicated on your chart in error. Also, I noticed that Potassium chloride is listed. Please add to Potassium. .. Take 2 pills only when furosemide is taken. Note: Furosemide is only taken as needed for fluid retention.

## 2018-03-27 NOTE — TELEPHONE ENCOUNTER
From: Jorgito Smith  To: Marva Friedman MD  Sent: 3/26/2018 12:23 PM EDT  Subject: Update Medical Information    Glaucoma screening is noted as \"needs to be done\" on my chart. I have already seen my eye doctor for my annual visit which included glaucoma check. I saw Dr. Johnathon Alicea at [facility] at [facility] on March 19th, 2018. He said everything looked good. I called them to send you a report on my visit and they said you need to request one. I'm not sure what else I need to do to get my chart updated.   Angelika Sahni

## 2018-03-29 ENCOUNTER — TELEPHONE (OUTPATIENT)
Dept: CARDIOLOGY CLINIC | Age: 63
End: 2018-03-29

## 2018-03-29 NOTE — TELEPHONE ENCOUNTER
Patient called in and stated that her BP has been running high. She stated that she is going out of town on Saturday so please let her know if she needs to up her BP med dosage. Thanks!   Phone [phone]  Max Carbon

## 2018-03-29 NOTE — TELEPHONE ENCOUNTER
The following e-mail sent to patient:    Good morning Ms. Anshul Levi,    Please provide blood pressures. We need daily pressures for at least a week to make any medication changes. If you can go back on your machine that's great, but if not we will need them going forward.     Thanks  Kesha Ocampo RN

## 2018-04-18 ENCOUNTER — HOSPITAL ENCOUNTER (OUTPATIENT)
Dept: LAB | Age: 63
Discharge: HOME | End: 2018-04-18
Payer: MEDICARE

## 2018-04-18 PROCEDURE — 86803 HEPATITIS C AB TEST: CPT

## 2018-04-19 LAB
HCV AB S/CO SERPL IA: <0.1 S/CO RATIO (ref 0–0.9)
HCV AB SERPL QL IA: NORMAL

## 2018-04-21 RX ORDER — AMLODIPINE BESYLATE 2.5 MG/1
TABLET ORAL
Qty: 180 TAB | Refills: 11 | Status: SHIPPED | OUTPATIENT
Start: 2018-04-21 | End: 2019-06-14 | Stop reason: SDUPTHER

## 2018-04-30 RX ORDER — SPIRONOLACTONE 25 MG/1
25 TABLET ORAL 2 TIMES DAILY
Qty: 60 TAB | Refills: 6 | Status: SHIPPED | OUTPATIENT
Start: 2018-04-30 | End: 2018-12-12 | Stop reason: SDUPTHER

## 2018-04-30 NOTE — TELEPHONE ENCOUNTER
Requested Prescriptions     Signed Prescriptions Disp Refills    spironolactone (ALDACTONE) 25 mg tablet 60 Tab 6     Sig: Take 1 Tab by mouth two (2) times a day.      Authorizing Provider: Indu Mathis     Ordering User: Lian Espinoza     Per orders     Note: patient email 3/29/18

## 2018-05-11 ENCOUNTER — TELEPHONE (OUTPATIENT)
Dept: CARDIOLOGY CLINIC | Age: 63
End: 2018-05-11

## 2018-05-11 NOTE — TELEPHONE ENCOUNTER
Patient called regarding email sent 5/8 reporting her BP. She would like to know what Dr. Patti Coley thinks, please call her at [phone].  Thank you

## 2018-05-11 NOTE — TELEPHONE ENCOUNTER
Returned patient's call, 2 pt identifiers used  Advised her e-mail was received and Dr. Cynthia Sanchez is out of the office and will respond back to her with any changes next week with upon her return.  Patient verbalized understanding

## 2018-05-25 ENCOUNTER — HOSPITAL ENCOUNTER (OUTPATIENT)
Dept: LAB | Age: 63
Discharge: HOME | End: 2018-05-25
Payer: MEDICARE

## 2018-05-25 PROCEDURE — 84436 ASSAY OF TOTAL THYROXINE: CPT

## 2018-05-25 PROCEDURE — 80076 HEPATIC FUNCTION PANEL: CPT

## 2018-05-25 PROCEDURE — 36415 COLL VENOUS BLD VENIPUNCTURE: CPT

## 2018-05-25 PROCEDURE — 84443 ASSAY THYROID STIM HORMONE: CPT

## 2018-05-25 PROCEDURE — 83704 LIPOPROTEIN BLD QUAN PART: CPT

## 2018-05-28 LAB
ALBUMIN SERPL-MCNC: 4.6 G/DL (ref 3.6–4.8)
ALP SERPL-CCNC: 67 IU/L (ref 39–117)
ALT SERPL-CCNC: 29 IU/L (ref 0–32)
AST SERPL-CCNC: 29 IU/L (ref 0–40)
BILIRUB DIRECT SERPL-MCNC: 0.19 MG/DL (ref 0–0.4)
BILIRUB SERPL-MCNC: 0.6 MG/DL (ref 0–1.2)
CHOLEST SERPL-MCNC: 138 MG/DL (ref 100–199)
HDL SERPL-SCNC: 40.7 UMOL/L
HDLC SERPL-MCNC: 51 MG/DL
LDL SERPL QN: 20.6 NM
LDL SERPL-SCNC: 978 NMOL/L
LDL SMALL SERPL-SCNC: 375 NMOL/L
LDLC SERPL CALC-MCNC: 67 MG/DL (ref 0–99)
LP-IR SCORE SERPL: 42
PROT SERPL-MCNC: 6.9 G/DL (ref 6–8.5)
T4 SERPL-MCNC: 8.2 UG/DL (ref 4.5–12)
TRIGL SERPL-MCNC: 100 MG/DL (ref 0–149)
TSH SERPL DL<=0.005 MIU/L-ACNC: 1.35 UIU/ML (ref 0.45–4.5)

## 2018-06-01 ENCOUNTER — OFFICE VISIT (OUTPATIENT)
Dept: CARDIOLOGY CLINIC | Age: 63
End: 2018-06-01

## 2018-06-01 VITALS
SYSTOLIC BLOOD PRESSURE: 130 MMHG | BODY MASS INDEX: 34.16 KG/M2 | DIASTOLIC BLOOD PRESSURE: 76 MMHG | HEIGHT: 69 IN | HEART RATE: 72 BPM | RESPIRATION RATE: 16 BRPM | WEIGHT: 230.6 LBS

## 2018-06-01 DIAGNOSIS — I10 ESSENTIAL HYPERTENSION: ICD-10-CM

## 2018-06-01 DIAGNOSIS — E78.5 HYPERLIPIDEMIA, UNSPECIFIED HYPERLIPIDEMIA TYPE: ICD-10-CM

## 2018-06-01 DIAGNOSIS — I25.10 ATHEROSCLEROSIS OF NATIVE CORONARY ARTERY OF NATIVE HEART WITHOUT ANGINA PECTORIS: Primary | ICD-10-CM

## 2018-06-01 RX ORDER — CETIRIZINE HCL 10 MG
10 TABLET ORAL DAILY
COMMUNITY
End: 2019-07-31 | Stop reason: ALTCHOICE

## 2018-06-01 NOTE — PROGRESS NOTES
[place]:   [phone]    HPI: Shashi Nuno, a 70y.o. year-old who presents for follow up regarding her palpitations and HTN. She has been doing great. She stopped her lasix and potassium in Fib and has not had any PVC since March. She does carry fluid every day in the legs. The swelling gets very bad if she is sitting, dangling the legs or walking too long like on a trip to [facility]. Reviewed her labs, here and her outside glucose of 98 and K of 4.3, everything looks fine. Going to have EGD and colonoscopy and she needs to hold the aspirin 81mg daily for 5 days prior. She is fighting her weight. But not making progress. She is eating well and not walking like she should. She wants to go to [employer] and I think that is fine. She feels like she can't take a dep breath and still has a cough and congestion and like a heaviness with taking a deep breath. Wants to know my thoughts on that. Appears to have trouble with the deep breath. She feels like she has a sinus drainage and feels strangled at times, has a chronic cough. Continues to exercise ok and is not limited. Has some foot pain. Continues to have tightness and fluid in the ankles and it is worse at the end of the day an with sitting or standing. The PVC are really a big source of stress for her. She took a lasix and it made them worse. So will trial taking the lasix plus potassium as needed. She is very frustrated about her weight. Exercising regularly every day but worrying about her weight every day. Reviewed her labs, lipids look great, K is fine, lft ok. Discussed EP eval to see if they can ablate the PVC, but she is not     She has had all the PVC in 2015- stopped the lasix and they went away until 1/17. Started spironolactone in 2/17. She was having trouble getting a deep breath and that got better when she stopped it. Then 2/5 she started with heart racing and that is bothering her at night now. Has several a week now. Rare after lunch she will feel a pvc. After dinner she often has irregular heartbeats. BP is up as well. The deep breath stuff is getting worse. CXR clear but the congestion continues as well. Labs looked good in 2/16 none since. Discussed the options for palps and htn. Offered stopping the dyazide and starting amlodipine or retrial of spironolactone. She feels like anxiety is part of the problem   Wants to know if she can take a higher dose of ativan. Also discussed verapamil that she took before or bystolic. She is on PPI for GERD and sees Dr. Rebeca Ny annually   LE edema minimal     -She gets her labs at [facility]  -Prefers that our office follow her labs  -After her last visit we received labs on 3/8/18 from [address] [place] [address] dated 3/6/18  BMP ok, Mg 2.1, Vit D 43    Doing fine on the spironolactone and BP is great today. A/P:  1. CAD- calcium score of 250 2011, continue aspirin and pravastatin  2. Hyperlipidemia- LDL 83 in 2/16, on pravastatin 40mg daily and fish oil, LDL goal < 70, will check fasting lipids prior to her return visit  3. Overweight- Body mass index is 34.05 kg/(m^2). encouraged her to exercise  4. PSVT- palpitations well controlled now, could not tolerate Bystolic, previously discussed suppression medications and anti-anxiety medications, not taking Verapamil SR (had been on it in her 30s for SVT at 220bpm but then stopped by Dr. Ashutosh Eden years later), now doing ok with the change in diuretic so potassium seems to be a culprit  -palpitations worsened by dyazide but for a while doing ok on dyazide/spironolactone/KCL combination, now palps good on spironolactone alone but   5. HTN- at goal now on amlodipine today and spironolactone  6. Elevated AST/ALT- resolved, will recheck prior to her return visit   7. Insulin Resistance-  continue diet and exercise   8. Vit D deficiency- on 1000 units daily   9.  Hypokalemia - stable now, K 4.1  10. Edema - well controlled now on spironolactone and dyazide  11. GERD - on Prilosec, followed by GI/Dr. Lenny Singh    Stress echo 7/15 exe 9:00, EF 60%, no WMA  Echo 7/15 EF 65%, normal  Stress nuc 2009 nl EF 67%  Ca Score 2011 250    Fhx +CAD    She  has a past medical history of Calculus of kidney; Chronic venous insufficiency; DDD (degenerative disc disease); GERD (gastroesophageal reflux disease); H/O hiatal hernia; History of actinic keratosis; and Hypertension. Cardiovascular ROS: no chest pain or dyspnea on exertion, positive for palpitations  Respiratory ROS: no cough, shortness of breath, or wheezing  Neurological ROS: no TIA or stroke symptoms  All other systems negative except as above. PE  Vitals:    06/01/18 1043   BP: 130/76   Pulse: 72   Resp: 16   Weight: 230 lb 9.6 oz (104.6 kg)   Height: 5' 9\" (1.753 m)    Body mass index is 34.05 kg/(m^2).   General appearance - alert, well appearing, and in no distress  Mental status - affect appropriate to mood  Eyes - sclera anicteric, moist mucous membranes  Neck - supple  Lymphatics - not assessed   Chest - clear to auscultation, no wheezes, rales or rhonchi  Heart - normal rate, regular rhythm, normal S1, S2, no murmurs, rubs, clicks or gallops  Abdomen - soft, nontender, nondistended  Back exam - full range of motion, no tenderness  Neurological - cranial nerves II through XII grossly intact, no focal deficit  Musculoskeletal - no muscular tenderness noted, normal strength  Extremities - peripheral pulses normal, trace LE edema  Skin - normal coloration  no rashes    12 lead ECG: NSR    Recent Labs:  Lab Results   Component Value Date/Time    Cholesterol, total 172 02/11/2016 10:27 AM    Cholesterol, Total 138 05/25/2018 12:00 AM    HDL Cholesterol 65 02/11/2016 10:27 AM    LDL, calculated 83 02/11/2016 10:27 AM    Triglyceride 118 02/11/2016 10:27 AM     Lab Results   Component Value Date/Time    Creatinine 0.80 02/11/2016 10:27 AM     Lab Results   Component Value Date/Time    BUN 13 02/11/2016 10:27 AM     Lab Results   Component Value Date/Time    Potassium 4.3 02/11/2016 10:27 AM     Lab Results   Component Value Date/Time    Hemoglobin A1c 5.4 02/11/2016 10:27 AM     No components found for: HGBI,  IHGB,  HGB,  HGBP,  WBHGB  Lab Results   Component Value Date/Time    PLATELET 042 82/33/0166 10:27 AM       Reviewed:  Past Medical History:   Diagnosis Date    Calculus of kidney     Chronic venous insufficiency     DDD (degenerative disc disease)     GERD (gastroesophageal reflux disease)     H/O hiatal hernia     gastritis    History of actinic keratosis     basal cell    Hypertension      History   Smoking Status    Never Smoker   Smokeless Tobacco    Never Used     History   Alcohol Use    0.0 oz/week    0 Standard drinks or equivalent per week     Comment: rarely     Allergies   Allergen Reactions    Boniva [Ibandronate] Other (comments)     Dysphagia      Carac [Fluorouracil] Cough and Other (comments)     Cogestion    Cozaar [Losartan] Shortness of Breath    Lisinopril Cough    Preservative Other (comments)     Preservative in eye drops make eyes red      Red Dye Hives    Sulfa (Sulfonamide Antibiotics) Rash       Current Outpatient Prescriptions   Medication Sig    cetirizine (ZYRTEC) 10 mg tablet Take 10 mg by mouth daily.  spironolactone (ALDACTONE) 25 mg tablet Take 1 Tab by mouth two (2) times a day.  amLODIPine (NORVASC) 2.5 mg tablet TAKE 2 TABLETS BY MOUTH DAILY    potassium chloride SR (KLOR-CON 8) 8 mEq tablet Take 2 tablets daily as needed (when taking furosemide).  pravastatin (PRAVACHOL) 40 mg tablet TAKE ONE TABLET BY MOUTH DAILY **YELLOW OBLONG TABLET**    furosemide (LASIX) 20 mg tablet Take 1 Tab by mouth daily as needed.  famotidine (PEPCID) 20 mg tablet Take 20 mg by mouth two (2) times a day.     LORazepam (ATIVAN) 0.5 mg tablet TAKE 1 TABLET BY MOUTH TWICE A DAY AS NEEDED FOR ANXIETY    OMEGA-3 FATTY ACIDS/FISH OIL (OMEGA 3 FISH OIL PO) Take  by mouth two (2) times a day. Indications: 1220 mg fish oil 360 mg omega-e    cholecalciferol (VITAMIN D3) 1,000 unit cap Take  by mouth daily.  multivitamin (ONE A DAY) tablet Take 1 Tab by mouth daily.  cranberry extract-vit C (AZO CRANBERRY PLUS VIT C) 250-60 mg cap Take 1 Tab by mouth daily.  aspirin 81 mg tablet Take 162 mg by mouth daily.  omeprazole (PRILOSEC) 20 mg capsule Take 20 mg by mouth. 1 capsule by mouth every 3 days and is in the process of weaning off     No current facility-administered medications for this visit.         [facility] [place]  [address], [address] [place] [address]

## 2018-06-01 NOTE — MR AVS SNAPSHOT
[address] [facility] [address] [place] 
[phone] Patient: Herman Atkinson MRN: [id number] VZO:[id number] Visit Information Date & Time Provider Department Dept. Phone Encounter #  
 6/1/2018 10:40 AM Maik Eckert MD [facility] [phone] [id number] Your Appointments 7/20/2018  3:30 PM  
ROUTINE CARE with Marilee Hilliard MD  
[place] [address] [facility] [address]) Appt Note: 4m f/u Medication re-evaluation. [address], [address] [facility] [address]  
[address], [address] [place] [id number]  
  
    
 6/7/2019 10:20 AM  
ESTABLISHED PATIENT with Maik Eckert MD  
[facility] ([address]) Appt Note: 1 year follow up  
 [address] [facility] [address] [place] [address]  
[address] [facility] [phone] Upcoming Health Maintenance Date Due Pneumococcal 65+ Low/Medium Risk (1 of 2 - PCV13) 1/29/2018 MEDICARE YEARLY EXAM 3/21/2018 Bone Densitometry (Dexa) Screening 6/29/2018* Influenza Age 5 to Adult 8/1/2018 BREAST CANCER SCRN MAMMOGRAM 6/6/2019 GLAUCOMA SCREENING Q2Y 3/19/2020 COLONOSCOPY 1/3/2024 DTaP/Tdap/Td series (2 - Td) 9/28/2025 *Topic was postponed. The date shown is not the original due date. Allergies as of 6/1/2018  Review Complete On: 6/1/2018 By: Evita File Severity Noted Reaction Type Reactions Boniva [Ibandronate]  04/25/2012    Other (comments) Dysphagia Carac [Fluorouracil]  10/17/2012    Cough, Other (comments) Cogestion Cozaar [Losartan]  05/18/2012    Shortness of Breath Lisinopril  02/15/2012    Cough Preservative  04/18/2012    Other (comments) Preservative in eye drops make eyes red Red Dye  02/15/2012    Hives Sulfa (Sulfonamide Antibiotics)  02/15/2012    Rash Current Immunizations  Reviewed on 4/14/2014 Name Date Influenza Vaccine 10/1/2016, 10/10/2015, 10/8/2014, 10/14/2013 Influenza Vaccine Whole 9/17/2012 TD Vaccine 10/17/2003 Tdap 9/28/2015 Zoster Vaccine, Live 1/16/2016 Not reviewed this visit You Were Diagnosed With   
  
 Codes Comments Atherosclerosis of native coronary artery of native heart without angina pectoris    -  Primary ICD-10-CM: I25.10 ICD-9-CM: 414.01 Hyperlipidemia, unspecified hyperlipidemia type     ICD-10-CM: E78.5 ICD-9-CM: 272.4 Essential hypertension     ICD-10-CM: I10 
ICD-9-CM: 401.9 Vitals BP Pulse Resp Height(growth percentile) Weight(growth percentile) BMI  
 130/76 (BP 1 Location: Left arm, BP Patient Position: Sitting) 72 16 5' 9\" (1.753 m) 230 lb 9.6 oz (104.6 kg) 34.05 kg/m2 OB Status Smoking Status Postmenopausal Never Smoker Vitals History BMI and BSA Data Body Mass Index Body Surface Area 34.05 kg/m 2 2.26 m 2 Preferred Pharmacy Pharmacy Name Phone [id number]- [place], [place] - [address] [facility] AT [address] [place] [address] [phone] Your Updated Medication List  
  
   
This list is accurate as of 6/1/18 11:28 AM.  Always use your most recent med list. amLODIPine 2.5 mg tablet Commonly known as:  [place] TAKE 2 TABLETS BY MOUTH DAILY  
  
 aspirin 81 mg tablet Take 162 mg by mouth daily. cranberry extract-vitamin C 250-60 mg Cap Commonly known as:  AZO CRANBERRY PLUS VIT C Take 1 Tab by mouth daily. furosemide 20 mg tablet Commonly known as:  LASIX Take 1 Tab by mouth daily as needed. LORazepam 0.5 mg tablet Commonly known as:  ATIVAN  
TAKE 1 TABLET BY MOUTH TWICE A DAY AS NEEDED FOR ANXIETY  
  
 multivitamin tablet Commonly known as:  ONE A DAY Take 1 Tab by mouth daily.   
  
 OMEGA 3 FISH OIL PO  
 Take  by mouth two (2) times a day. Indications: 1220 mg fish oil 360 mg omega-e  
  
 omeprazole 20 mg capsule Commonly known as:  PRILOSEC Take 20 mg by mouth. 1 capsule by mouth every 3 days and is in the process of weaning off PEPCID 20 mg tablet Generic drug:  famotidine Take 20 mg by mouth two (2) times a day. potassium chloride SR 8 mEq tablet Commonly known as:  KLOR-CON 8 Take 2 tablets daily as needed (when taking furosemide). pravastatin 40 mg tablet Commonly known as:  PRAVACHOL  
TAKE ONE TABLET BY MOUTH DAILY **YELLOW OBLONG TABLET**  
  
 spironolactone 25 mg tablet Commonly known as:  ALDACTONE Take 1 Tab by mouth two (2) times a day. VITAMIN D3 1,000 unit Cap Generic drug:  cholecalciferol Take  by mouth daily. ZyrTEC 10 mg tablet Generic drug:  cetirizine Take 10 mg by mouth daily. We Performed the Following AMB POC EKG ROUTINE W/ 12 LEADS, INTER & REP [10131 CPT(R)] Introducing [facility]! Dear Yvonne Ruiz: Thank you for requesting a [employer] account. Our records indicate that you already have an active [employer] account. You can access your account anytime at [email or web address] Did you know that you can access your hospital and ER discharge instructions at any time in [employer]? You can also review all of your test results from your hospital stay or ER visit. Additional Information If you have questions, please visit the Frequently Asked Questions section of the [employer] website at [email or web address] Remember, [employer] is NOT to be used for urgent needs. For medical emergencies, dial 911. Now available from your iPhone and Android! Please provide this summary of care documentation to your next provider. Your primary care clinician is listed as Ivonne Mcclain. If you have any questions after today's visit, please call [phone].

## 2018-07-05 ENCOUNTER — HOSPITAL ENCOUNTER (OUTPATIENT)
Dept: LAB | Age: 63
Discharge: HOME | End: 2018-07-05
Payer: MEDICARE

## 2018-07-05 ENCOUNTER — OFFICE VISIT (OUTPATIENT)
Dept: INTERNAL MEDICINE CLINIC | Age: 63
End: 2018-07-05

## 2018-07-05 VITALS
TEMPERATURE: 98.2 F | BODY MASS INDEX: 33.98 KG/M2 | WEIGHT: 229.4 LBS | OXYGEN SATURATION: 98 % | RESPIRATION RATE: 16 BRPM | DIASTOLIC BLOOD PRESSURE: 78 MMHG | SYSTOLIC BLOOD PRESSURE: 120 MMHG | HEART RATE: 77 BPM | HEIGHT: 69 IN

## 2018-07-05 DIAGNOSIS — Z00.00 WELCOME TO MEDICARE PREVENTIVE VISIT: Primary | ICD-10-CM

## 2018-07-05 DIAGNOSIS — R31.9 URINARY TRACT INFECTION WITH HEMATURIA, SITE UNSPECIFIED: ICD-10-CM

## 2018-07-05 DIAGNOSIS — R30.0 DYSURIA: ICD-10-CM

## 2018-07-05 DIAGNOSIS — N39.0 URINARY TRACT INFECTION WITH HEMATURIA, SITE UNSPECIFIED: ICD-10-CM

## 2018-07-05 LAB
BILIRUB UR QL STRIP: NEGATIVE
GLUCOSE UR-MCNC: NEGATIVE MG/DL
KETONES P FAST UR STRIP-MCNC: NEGATIVE MG/DL
PH UR STRIP: 5.5 [PH] (ref 4.6–8)
PROT UR QL STRIP: NEGATIVE
SP GR UR STRIP: 1.02 (ref 1–1.03)
UA UROBILINOGEN AMB POC: NORMAL (ref 0.2–1)
URINALYSIS CLARITY POC: CLEAR
URINALYSIS COLOR POC: YELLOW
URINE BLOOD POC: NORMAL
URINE LEUKOCYTES POC: NORMAL
URINE NITRITES POC: NEGATIVE

## 2018-07-05 PROCEDURE — 87086 URINE CULTURE/COLONY COUNT: CPT

## 2018-07-05 NOTE — PATIENT INSTRUCTIONS
Follow a low sodium diet. Stay physically active. Drink plenty of water. Follow up with your specialists. Continue your current medications. Medicare Wellness Visit, Female    The best way to live healthy is to have a lifestyle where you eat a well-balanced diet, exercise regularly, limit alcohol use, and quit all forms of tobacco/nicotine, if applicable. Regular preventive services are another way to keep healthy. Preventive services (vaccines, screening tests, monitoring & exams) can help personalize your care plan, which helps you manage your own care. Screening tests can find health problems at the earliest stages, when they are easiest to treat. [facility] follows the current, evidence-based guidelines published by the [facility] Harjit Yajaira ([facility]) when recommending preventive services for our patients. Because we follow these guidelines, sometimes recommendations change over time as research supports it. (For example, mammograms used to be recommended annually. Even though Medicare will still pay for an annual mammogram, the newer guidelines recommend a mammogram every two years for women of average risk.)    Of course, you and your provider may decide to screen more often for some diseases, based on your risk and co-morbidities (chronic disease you are already diagnosed with). Preventive services for you include:    - Medicare offers their members a free annual wellness visit, which is time for you and your primary care provider to discuss and plan for your preventive service needs. Take advantage of this benefit every year!    -All people over age 72 should receive the recommended pneumonia vaccines. Current USPSTF guidelines recommend a series of two vaccines for the best pneumonia protection.     -All adults should have a yearly flu vaccine and a tetanus vaccine every 10 years.  All adults age 61 years should receive a shingles vaccine once in their lifetime.      -A bone mass density test is recommended when a woman turns 65 to screen for osteoporosis. This test is only recommended once as a screening. Some providers will use this same test as a disease monitoring tool if you already have osteoporosis. -All adults age 38-68 years who are overweight should have a diabetes screening test once every three years.     -Other screening tests & preventive services for persons with diabetes include: an eye exam to screen for diabetic retinopathy, a kidney function test, a foot exam, and stricter control over your cholesterol.     -Cardiovascular screening for adults with routine risk involves an electrocardiogram (ECG) at intervals determined by the provider.     -Colorectal cancer screenings should be done for adults age 54-65 years with normal risk. There are a number of acceptable methods of screening for this type of cancer. Each test has its own benefits and drawbacks. Discuss with your provider what is most appropriate for you during your annual wellness visit. The different tests include: colonoscopy (considered the best screening method), a fecal occult blood test, a fecal DNA test, and sigmoidoscopy. -Breast cancer screenings are recommended every other year for women of normal risk age 54-69 years.     -Cervical cancer screenings for women over age 72 are only recommended with certain risk factors.     -All adults born between [facility] should be screened once for Hepatitis C. Here is a list of your current Health Maintenance items (your personalized list of preventive services) with a due date: There are no preventive care reminders to display for this patient.

## 2018-07-05 NOTE — PROGRESS NOTES
Chief Complaint   Patient presents with    Bladder Infection     Reviewed record in preparation for visit and have obtained necessary documentation. Identified pt with two pt identifiers(name and ). Health Maintenance Due   Topic    Bone Densitometry (Dexa) Screening     Pneumococcal 65+ Low/Medium Risk (1 of 2 - PCV13)    MEDICARE YEARLY EXAM          Chief Complaint   Patient presents with    Bladder Infection        Wt Readings from Last 3 Encounters:   18 229 lb 6.4 oz (104.1 kg)   18 230 lb 9.6 oz (104.6 kg)   18 235 lb (106.6 kg)     Temp Readings from Last 3 Encounters:   18 98.2 °F (36.8 °C) (Oral)   18 99 °F (37.2 °C) (Oral)   17 99.6 °F (37.6 °C) (Oral)     BP Readings from Last 3 Encounters:   18 120/78   18 130/76   18 150/80     Pulse Readings from Last 3 Encounters:   18 77   18 72   18 88           Learning Assessment:  :     Learning Assessment 6/10/2015 2014 10/17/2012   PRIMARY LEARNER Patient Patient Patient   HIGHEST LEVEL OF EDUCATION - PRIMARY LEARNER  SOME COLLEGE SOME COLLEGE -   BARRIERS PRIMARY LEARNER NONE NONE -   CO-LEARNER CAREGIVER No No -   PRIMARY LANGUAGE ENGLISH ENGLISH ENGLISH    NEED No No -   LEARNER PREFERENCE PRIMARY DEMONSTRATION OTHER (COMMENT) VIDEOS   LEARNING SPECIAL TOPICS no no -   ANSWERED BY patient patient patient   RELATIONSHIP SELF SELF SELF   ASSESSMENT COMMENT none none -       Depression Screening:  :     PHQ over the last two weeks 2018   Little interest or pleasure in doing things Not at all   Feeling down, depressed or hopeless Not at all   Total Score PHQ 2 0       Fall Risk Assessment:  :     Fall Risk Assessment, last 12 mths 3/21/2018   Able to walk? Yes       Abuse Screening:  :     Abuse Screening Questionnaire 6/10/2015 2014   Do you ever feel afraid of your partner?  N N   Are you in a relationship with someone who physically or mentally threatens you? N N   Is it safe for you to go home? Y Y       Coordination of Care Questionnaire:  :     1) Have you been to an emergency room, urgent care clinic since your last visit? no   Hospitalized since your last visit? no             2) Have you seen or consulted any other health care providers outside of [facility] since your last visit? no  (Include any pap smears or colon screenings in this section.)    3) Do you have an Advance Directive on file? no    4) Are you interested in receiving information on Advance Directives? NO      Patient is accompanied by self I have received verbal consent from Joshua Nettles to discuss any/all medical information while they are present in the room. Reviewed record  In preparation for visit and have obtained necessary documentation.

## 2018-07-05 NOTE — PROGRESS NOTES
Subjective:     Chief Complaint   Patient presents with    Bladder Infection     She  is a 70y.o. year old female who presents for evaluation. Has symptoms of \"the start of a urinary infection\" since 6/23/2018. Went to  and placed on Macrobid which she finished several weeks ago. Had previously been seen for dysuria and found to have hematuria but no infection. Sent to Urology and had cystoscopy, urine cytology, etc and nothing of import found. Due for Welcome to Medicare. Historical Data    Past Medical History:   Diagnosis Date    Calculus of kidney     Chronic venous insufficiency     DDD (degenerative disc disease)     GERD (gastroesophageal reflux disease)     H/O hiatal hernia     gastritis    History of actinic keratosis     basal cell    Hypertension         Past Surgical History:   Procedure Laterality Date    ENDOSCOPY, COLON, DIAGNOSTIC  5/2003    (Brand)-due 2013    HX DILATION AND CURETTAGE  1990's    HX HEENT      T&A    HX HEENT      left eyelid surg. Outpatient Encounter Prescriptions as of 7/5/2018   Medication Sig Dispense Refill    spironolactone (ALDACTONE) 25 mg tablet Take 1 Tab by mouth two (2) times a day. 60 Tab 6    amLODIPine (NORVASC) 2.5 mg tablet TAKE 2 TABLETS BY MOUTH DAILY 180 Tab 11    pravastatin (PRAVACHOL) 40 mg tablet TAKE ONE TABLET BY MOUTH DAILY **YELLOW OBLONG TABLET** 90 Tab 3    furosemide (LASIX) 20 mg tablet Take 1 Tab by mouth daily as needed. 30 Tab 8    famotidine (PEPCID) 20 mg tablet Take 20 mg by mouth two (2) times a day.  OMEGA-3 FATTY ACIDS/FISH OIL (OMEGA 3 FISH OIL PO) Take  by mouth two (2) times a day. Indications: 1220 mg fish oil 360 mg omega-e      cholecalciferol (VITAMIN D3) 1,000 unit cap Take  by mouth daily.  multivitamin (ONE A DAY) tablet Take 1 Tab by mouth daily.  cranberry extract-vit C (AZO CRANBERRY PLUS VIT C) 250-60 mg cap Take 1 Tab by mouth daily.  30 Tab 0    aspirin 81 mg tablet Take 162 mg by mouth daily.  cetirizine (ZYRTEC) 10 mg tablet Take 10 mg by mouth daily.  potassium chloride SR (KLOR-CON 8) 8 mEq tablet Take 2 tablets daily as needed (when taking furosemide). 60 Tab 1    LORazepam (ATIVAN) 0.5 mg tablet TAKE 1 TABLET BY MOUTH TWICE A DAY AS NEEDED FOR ANXIETY 60 Tab 3    omeprazole (PRILOSEC) 20 mg capsule Take 20 mg by mouth. 1 capsule by mouth every 3 days and is in the process of weaning off       No facility-administered encounter medications on file as of 7/5/2018. Allergies   Allergen Reactions    Boniva [Ibandronate] Other (comments)     Dysphagia      Carac [Fluorouracil] Cough and Other (comments)     Cogestion    Cozaar [Losartan] Shortness of Breath    Lisinopril Cough    Preservative Other (comments)     Preservative in eye drops make eyes red      Red Dye Hives    Sulfa (Sulfonamide Antibiotics) Rash        Social History     Social History    Marital status:      Spouse name: N/A    Number of children: N/A    Years of education: N/A     Occupational History    Not on file. Social History Main Topics    Smoking status: Never Smoker    Smokeless tobacco: Never Used    Alcohol use 0.0 oz/week     0 Standard drinks or equivalent per week      Comment: rarely    Drug use: No    Sexual activity: Yes     Partners: Male     Other Topics Concern    Not on file     Social History Narrative        Review of Systems  A comprehensive review of systems was negative except for that written in the HPI. Objective:     Vitals:    07/05/18 1056   BP: 120/78   Pulse: 77   Resp: 16   Temp: 98.2 °F (36.8 °C)   TempSrc: Oral   SpO2: 98%   Weight: 229 lb 6.4 oz (104.1 kg)   Height: 5' 9\" (1.753 m)     Pleasant WF. No CVA tenderness. Abdomen: Soft and non-tender.   Results for orders placed or performed in visit on 07/05/18   AMB POC URINALYSIS DIP STICK AUTO W/O MICRO   Result Value Ref Range    Color (UA POC) Yellow     Clarity (UA POC) Clear Glucose (UA POC) Negative Negative    Bilirubin (UA POC) Negative Negative    Ketones (UA POC) Negative Negative    Specific gravity (UA POC) 1.020 1.001 - 1.035    Blood (UA POC) 1+ Negative    pH (UA POC) 5.5 4.6 - 8.0    Protein (UA POC) Negative Negative    Urobilinogen (UA POC) 0.2 mg/dL 0.2 - 1    Nitrites (UA POC) Negative Negative    Leukocyte esterase (UA POC) Trace Negative         ASSESSMENT / PLAN:   1. Welcome to Medicare preventive visit  · Review completed. 2. Urinary tract infection with hematuria, site unspecified  · Await culture results as she was just on Macrobid. - AMB POC URINALYSIS DIP STICK AUTO W/O MICRO  - CULTURE, URINE    3. Dysuria           Follow-up Disposition:  Return in about 4 months (around 11/5/2018) for F/U HTN, F/U HLD. Advised her to call back or return to office if symptoms worsen/change/persist.  Discussed expected course/resolution/complications of diagnosis in detail with patient. Medication risks/benefits/costs/interactions/alternatives discussed with patient. She was given an after visit summary which includes diagnoses, current medications, & vitals. She expressed understanding with the diagnosis and plan. This is a \"Welcome to [employer]"  Initial Preventive Physical Examination (IPPE) providing Personalized Prevention Plan Services (Performed in the first 12 months of enrollment)    I have reviewed the patient's medical history in detail and updated the computerized patient record.      History     Past Medical History:   Diagnosis Date    Calculus of kidney     Chronic venous insufficiency     DDD (degenerative disc disease)     GERD (gastroesophageal reflux disease)     H/O hiatal hernia     gastritis    History of actinic keratosis     basal cell    Hypertension       Past Surgical History:   Procedure Laterality Date    ENDOSCOPY, COLON, DIAGNOSTIC  5/2003    (Brand)-due 2013    HX DILATION AND CURETTAGE  1990's    HX HEENT      T&A    HX HEENT      left eyelid surg. Current Outpatient Prescriptions   Medication Sig Dispense Refill    spironolactone (ALDACTONE) 25 mg tablet Take 1 Tab by mouth two (2) times a day. 60 Tab 6    amLODIPine (NORVASC) 2.5 mg tablet TAKE 2 TABLETS BY MOUTH DAILY 180 Tab 11    pravastatin (PRAVACHOL) 40 mg tablet TAKE ONE TABLET BY MOUTH DAILY **YELLOW OBLONG TABLET** 90 Tab 3    furosemide (LASIX) 20 mg tablet Take 1 Tab by mouth daily as needed. 30 Tab 8    famotidine (PEPCID) 20 mg tablet Take 20 mg by mouth two (2) times a day.  OMEGA-3 FATTY ACIDS/FISH OIL (OMEGA 3 FISH OIL PO) Take  by mouth two (2) times a day. Indications: 1220 mg fish oil 360 mg omega-e      cholecalciferol (VITAMIN D3) 1,000 unit cap Take  by mouth daily.  multivitamin (ONE A DAY) tablet Take 1 Tab by mouth daily.  cranberry extract-vit C (AZO CRANBERRY PLUS VIT C) 250-60 mg cap Take 1 Tab by mouth daily. 30 Tab 0    aspirin 81 mg tablet Take 162 mg by mouth daily.  cetirizine (ZYRTEC) 10 mg tablet Take 10 mg by mouth daily.  potassium chloride SR (KLOR-CON 8) 8 mEq tablet Take 2 tablets daily as needed (when taking furosemide). 60 Tab 1    LORazepam (ATIVAN) 0.5 mg tablet TAKE 1 TABLET BY MOUTH TWICE A DAY AS NEEDED FOR ANXIETY 60 Tab 3    omeprazole (PRILOSEC) 20 mg capsule Take 20 mg by mouth.  1 capsule by mouth every 3 days and is in the process of weaning off       Allergies   Allergen Reactions    Boniva [Ibandronate] Other (comments)     Dysphagia      Carac [Fluorouracil] Cough and Other (comments)     Cogestion    Cozaar [Losartan] Shortness of Breath    Lisinopril Cough    Preservative Other (comments)     Preservative in eye drops make eyes red      Red Dye Hives    Sulfa (Sulfonamide Antibiotics) Rash     Family History   Problem Relation Age of Onset    Cancer Father      lung (+tob)    Thyroid Disease Mother      Social History   Substance Use Topics    Smoking status: Never Smoker    Smokeless tobacco: Never Used    Alcohol use 0.0 oz/week     0 Standard drinks or equivalent per week      Comment: rarely     Diet, Lifestyle: The patient is prescribed and follows a special diet. Exercise level: moderately active    Depression Risk Screen     PHQ over the last two weeks 7/5/2018   Little interest or pleasure in doing things Not at all   Feeling down, depressed or hopeless Not at all   Total Score PHQ 2 0     Alcohol Risk Screen   You do not drink alcohol or very rarely. Functional Ability and Level of Safety   Hearing Loss  Hearing is good. Vision Screening  Vision is good. No exam data present      Activities of Daily Living  The home contains: no safety equipment. Patient does total self care    Fall Risk Screen  Fall Risk Assessment, last 12 mths 3/21/2018   Able to walk? Yes       Abuse Screen  Patient is not abused    Screening EKG   EKG order placed: No    Patient Care Team   Patient Care Team:  Carmelita Cherry MD as PCP - General (Internal Medicine)  Joao Caldera NP as PCP - GASTROENTEROLOGY (Nurse Practitioner)  Cintia Hicks MD (Obstetrics & Gynecology)  Jodi Tineo MD (Gastroenterology)  Bayron Lynne MD (Cardiology)  Claribel Martinez MD as Physician (Ophthalmology)     End of Life Planning   Advanced care planning directives were discussed with the patient and /or family/caregiver. Assessment/Plan   Education and counseling provided:  Are appropriate based on today's review and evaluation  End-of-Life planning (with patient's consent)    Diagnoses and all orders for this visit:    1.  Urinary tract infection with hematuria, site unspecified  -     AMB POC URINALYSIS DIP STICK AUTO W/O MICRO  -     CULTURE, URINE        Health Maintenance Due   Topic Date Due    Bone Densitometry (Dexa) Screening  01/29/2018    Pneumococcal 65+ Low/Medium Risk (1 of 2 - PCV13) 01/29/2018    MEDICARE YEARLY EXAM  03/21/2018

## 2018-07-05 NOTE — MR AVS SNAPSHOT
[address] [facility], [address] [place] 
[phone] Patient: Florecita Moreno MRN: [id number] K:1/11/0303 Visit Information Date & Time Provider Department Dept. Phone Encounter #  
 7/5/2018 10:45 AM MD Darinel Cyrva [facility] [phone] Follow-up Instructions Return in about 4 months (around 11/5/2018) for F/U HTN, F/U HLD. Your Appointments 6/7/2019 10:20 AM  
ESTABLISHED PATIENT with Dillon Shrestha MD  
[facility] ([address]) Appt Note: 1 year follow up  
 [address] [place] [address] [place]  
[address] [facility] 7 [address] Upcoming Health Maintenance Date Due Pneumococcal 65+ Low/Medium Risk (1 of 2 - PCV13) 9/19/2018* Bone Densitometry (Dexa) Screening 8/14/2019* Influenza Age 5 to Adult 8/1/2018 BREAST CANCER SCRN MAMMOGRAM 6/6/2019 MEDICARE YEARLY EXAM 7/6/2019 GLAUCOMA SCREENING Q2Y 3/19/2020 COLONOSCOPY 1/3/2024 DTaP/Tdap/Td series (2 - Td) 9/28/2025 *Topic was postponed. The date shown is not the original due date. Allergies as of 7/5/2018  Review Complete On: 7/5/2018 By: Rebeka Kendrick MD  
  
 Severity Noted Reaction Type Reactions Boniva [Ibandronate]  04/25/2012    Other (comments) Dysphagia Carac [Fluorouracil]  10/17/2012    Cough, Other (comments) Cogestion Cozaar [Losartan]  05/18/2012    Shortness of Breath Lisinopril  02/15/2012    Cough Preservative  04/18/2012    Other (comments) Preservative in eye drops make eyes red Red Dye  02/15/2012    Hives Sulfa (Sulfonamide Antibiotics)  02/15/2012    Rash Current Immunizations  Reviewed on 4/14/2014 Name Date Influenza Vaccine 10/1/2016, 10/10/2015, 10/8/2014, 10/14/2013 Influenza Vaccine Whole 9/17/2012 TD Vaccine 10/17/2003 Tdap 9/28/2015 Zoster Vaccine, Live 1/16/2016 Not reviewed this visit You Were Diagnosed With   
  
 Codes Comments Welcome to Medicare preventive visit    -  Primary ICD-10-CM: Z00.00 ICD-9-CM: V70.0 Urinary tract infection with hematuria, site unspecified     ICD-10-CM: N39.0, R31.9 ICD-9-CM: 599.0 Dysuria     ICD-10-CM: R30.0 ICD-9-CM: 775. 1 Vitals BP Pulse Temp Resp Height(growth percentile) Weight(growth percentile) 120/78 (BP 1 Location: Left arm, BP Patient Position: Sitting) 77 98.2 °F (36.8 °C) (Oral) 16 5' 9\" (1.753 m) 229 lb 6.4 oz (104.1 kg) SpO2 BMI OB Status Smoking Status 98% 33.88 kg/m2 Postmenopausal Never Smoker BMI and BSA Data Body Mass Index Body Surface Area  
 33.88 kg/m 2 2.25 m 2 Preferred Pharmacy Pharmacy Name Phone [facility]Y [facility][id number] [place] [phone] [place] [phone] Your Updated Medication List  
  
   
This list is accurate as of 7/5/18 11:27 AM.  Always use your most recent med list. amLODIPine 2.5 mg tablet Commonly known as:  Kana Presser TAKE 2 TABLETS BY MOUTH DAILY  
  
 aspirin 81 mg tablet Take 162 mg by mouth daily. cranberry extract-vitamin C 250-60 mg Cap Commonly known as:  AZO CRANBERRY PLUS VIT C Take 1 Tab by mouth daily. furosemide 20 mg tablet Commonly known as:  LASIX Take 1 Tab by mouth daily as needed. LORazepam 0.5 mg tablet Commonly known as:  ATIVAN  
TAKE 1 TABLET BY MOUTH TWICE A DAY AS NEEDED FOR ANXIETY  
  
 multivitamin tablet Commonly known as:  ONE A DAY Take 1 Tab by mouth daily. OMEGA 3 FISH OIL PO Take  by mouth two (2) times a day. Indications: 1220 mg fish oil 360 mg omega-e  
  
 omeprazole 20 mg capsule Commonly known as:  PRILOSEC Take 20 mg by mouth. 1 capsule by mouth every 3 days and is in the process of weaning off PEPCID 20 mg tablet Generic drug:  famotidine Take 20 mg by mouth two (2) times a day. potassium chloride SR 8 mEq tablet Commonly known as:  KLOR-CON 8 Take 2 tablets daily as needed (when taking furosemide). pravastatin 40 mg tablet Commonly known as:  PRAVACHOL  
TAKE ONE TABLET BY MOUTH DAILY **YELLOW OBLONG TABLET**  
  
 spironolactone 25 mg tablet Commonly known as:  ALDACTONE Take 1 Tab by mouth two (2) times a day. VITAMIN D3 1,000 unit Cap Generic drug:  cholecalciferol Take  by mouth daily. ZyrTEC 10 mg tablet Generic drug:  cetirizine Take 10 mg by mouth daily. We Performed the Following AMB POC URINALYSIS DIP STICK AUTO W/O MICRO [17692 CPT(R)] CULTURE, URINE [id number] CPT(R)] Follow-up Instructions Return in about 4 months (around 11/5/2018) for F/U HTN, F/U HLD. Patient Instructions Follow a low sodium diet. Stay physically active. Drink plenty of water. Follow up with your specialists. Continue your current medications. Medicare Wellness Visit, Female The best way to live healthy is to have a lifestyle where you eat a well-balanced diet, exercise regularly, limit alcohol use, and quit all forms of tobacco/nicotine, if applicable. Regular preventive services are another way to keep healthy. Preventive services (vaccines, screening tests, monitoring & exams) can help personalize your care plan, which helps you manage your own care. Screening tests can find health problems at the earliest stages, when they are easiest to treat. [facility] follows the current, evidence-based guidelines published by the [facility] States Harjit Gates ([facility]) when recommending preventive services for our patients. Because we follow these guidelines, sometimes recommendations change over time as research supports it. (For example, mammograms used to be recommended annually. Even though Medicare will still pay for an annual mammogram, the newer guidelines recommend a mammogram every two years for women of average risk.) Of course, you and your provider may decide to screen more often for some diseases, based on your risk and co-morbidities (chronic disease you are already diagnosed with). Preventive services for you include: - Medicare offers their members a free annual wellness visit, which is time for you and your primary care provider to discuss and plan for your preventive service needs. Take advantage of this benefit every year! 
 
-All people over age 72 should receive the recommended pneumonia vaccines. Current USPSTF guidelines recommend a series of two vaccines for the best pneumonia protection.  
 
-All adults should have a yearly flu vaccine and a tetanus vaccine every 10 years. All adults age 61 years should receive a shingles vaccine once in their lifetime.   
 
-A bone mass density test is recommended when a woman turns 65 to screen for osteoporosis. This test is only recommended once as a screening. Some providers will use this same test as a disease monitoring tool if you already have osteoporosis. -All adults age 38-68 years who are overweight should have a diabetes screening test once every three years.  
 
-Other screening tests & preventive services for persons with diabetes include: an eye exam to screen for diabetic retinopathy, a kidney function test, a foot exam, and stricter control over your cholesterol.  
 
-Cardiovascular screening for adults with routine risk involves an electrocardiogram (ECG) at intervals determined by the provider.  
 
-Colorectal cancer screenings should be done for adults age 54-65 years with normal risk. There are a number of acceptable methods of screening for this type of cancer. Each test has its own benefits and drawbacks.  Discuss with your provider what is most appropriate for you during your annual wellness visit. The different tests include: colonoscopy (considered the best screening method), a fecal occult blood test, a fecal DNA test, and sigmoidoscopy. -Breast cancer screenings are recommended every other year for women of normal risk age 54-69 years.  
 
-Cervical cancer screenings for women over age 72 are only recommended with certain risk factors.  
 
-All adults born between [facility] should be screened once for Hepatitis C. Here is a list of your current Health Maintenance items (your personalized list of preventive services) with a due date: There are no preventive care reminders to display for this patient. Introducing [facility]! Dear Dave Ruiz: Thank you for requesting a impok account. Our records indicate that you already have an active impok account. You can access your account anytime at [email or web address] Did you know that you can access your hospital and ER discharge instructions at any time in impok? You can also review all of your test results from your hospital stay or ER visit. Additional Information If you have questions, please visit the Frequently Asked Questions section of the impok website at [email or web address] Remember, impok is NOT to be used for urgent needs. For medical emergencies, dial 911. Now available from your iPhone and Android! Please provide this summary of care documentation to your next provider. Your primary care clinician is listed as Debbie Jimenez. If you have any questions after today's visit, please call [phone].

## 2018-07-07 LAB — BACTERIA UR CULT: NO GROWTH

## 2018-08-14 ENCOUNTER — OFFICE VISIT (OUTPATIENT)
Dept: INTERNAL MEDICINE CLINIC | Age: 63
End: 2018-08-14

## 2018-08-14 VITALS
WEIGHT: 226.6 LBS | DIASTOLIC BLOOD PRESSURE: 84 MMHG | BODY MASS INDEX: 33.56 KG/M2 | HEIGHT: 69 IN | SYSTOLIC BLOOD PRESSURE: 138 MMHG | RESPIRATION RATE: 16 BRPM | TEMPERATURE: 98.1 F | OXYGEN SATURATION: 97 % | HEART RATE: 72 BPM

## 2018-08-14 DIAGNOSIS — E78.5 HYPERLIPIDEMIA, UNSPECIFIED HYPERLIPIDEMIA TYPE: ICD-10-CM

## 2018-08-14 DIAGNOSIS — Z85.828 S/P MOHS SURGERY FOR BASAL CELL CARCINOMA: ICD-10-CM

## 2018-08-14 DIAGNOSIS — Z98.890 S/P MOHS SURGERY FOR BASAL CELL CARCINOMA: ICD-10-CM

## 2018-08-14 DIAGNOSIS — L92.9 GRANULATION TISSUE ABNORMALITY: Primary | ICD-10-CM

## 2018-08-14 DIAGNOSIS — Z01.818 PRE-OP EVALUATION: ICD-10-CM

## 2018-08-14 PROBLEM — R33.9 BLADDER RETENTION OF URINE: Status: ACTIVE | Noted: 2018-08-14

## 2018-08-14 RX ORDER — NITROFURANTOIN MACROCRYSTALS 50 MG/1
50 CAPSULE ORAL
Refills: 3 | COMMUNITY
Start: 2018-07-24 | End: 2019-07-19 | Stop reason: ALTCHOICE

## 2018-08-14 NOTE — PROGRESS NOTES
Prabhakar Pan is a 70 y.o. female     Chief Complaint   Patient presents with    Pre-op Exam     Eye       Visit Vitals    /84 (BP 1 Location: Left arm, BP Patient Position: Sitting)    Pulse 72    Temp 98.1 °F (36.7 °C) (Oral)    Resp 16    Ht 5' 9\" (1.753 m)    Wt 226 lb 9.6 oz (102.8 kg)    SpO2 97%    BMI 33.46 kg/m2       There are no preventive care reminders to display for this patient. 1. Have you been to the ER, urgent care clinic since your last visit? Hospitalized since your last visit? No    2. Have you seen or consulted any other health care providers outside of the [address] [place] [address] since your last visit? Include any pap smears or colon screening.  No

## 2018-08-14 NOTE — MR AVS SNAPSHOT
[address] [facility], [address] [place] 
[phone] Patient: Maryse Garcia MRN: [id number] BZN:5/08/[birth year]12 Visit Information Date & Time Provider Department Dept. Phone Encounter #  
 8/14/2018  2:20 PM Romana Ammon, NP [place] [facility] [phone] [id number] Your Appointments 6/7/2019 10:20 AM  
ESTABLISHED PATIENT with Charlotte Candelaria MD  
[facility] ([address] [facility]) Appt Note: 1 year follow up  
 [address] [facility] [address] [place] [address]  
[address] [facility] 7 [address] Upcoming Health Maintenance Date Due Pneumococcal 65+ Low/Medium Risk (1 of 2 - PCV13) 9/19/2018* Influenza Age 5 to Adult 10/1/2018* Bone Densitometry (Dexa) Screening 8/14/2019* BREAST CANCER SCRN MAMMOGRAM 6/6/2019 MEDICARE YEARLY EXAM 7/6/2019 GLAUCOMA SCREENING Q2Y 3/19/2020 COLONOSCOPY 1/3/2024 DTaP/Tdap/Td series (2 - Td) 9/28/2025 *Topic was postponed. The date shown is not the original due date. Allergies as of 8/14/2018  Review Complete On: 8/14/2018 By: Romana Ammon, NP Severity Noted Reaction Type Reactions Sulfa (Sulfonamide Antibiotics) High 02/15/2012   Side Effect Rash Generalized rash/hives Boniva [Ibandronate]  04/25/2012    Other (comments) Dysphagia Carac [Fluorouracil]  10/17/2012    Cough, Other (comments) Cogestion Cozaar [Losartan]  05/18/2012    Shortness of Breath Lisinopril  02/15/2012    Cough Preservative  04/18/2012    Other (comments) Preservative in eye drops make eyes red Red Dye Low 02/15/2012   Topical Rash Current Immunizations  Reviewed on 4/14/2014 Name Date Influenza Vaccine 10/1/2016, 10/10/2015, 10/8/2014, 10/14/2013 Influenza Vaccine Whole 9/17/2012 TD Vaccine 10/17/2003 Tdap 9/28/2015 Zoster Vaccine, Live 1/16/2016 Not reviewed this visit You Were Diagnosed With   
  
 Codes Comments Granulation tissue abnormality    -  Primary ICD-10-CM: L92.9 ICD-9-CM: 701.5 Pre-op evaluation     ICD-10-CM: U80.722 ICD-9-CM: V72.84 S/P Mohs surgery for basal cell carcinoma     ICD-10-CM: D81.490, I59.266 ICD-9-CM: V45.89, V10.83 Vitals BP Pulse Temp Resp Height(growth percentile) Weight(growth percentile) 138/84 (BP 1 Location: Left arm, BP Patient Position: Sitting) 72 98.1 °F (36.7 °C) (Oral) 16 5' 9\" (1.753 m) 226 lb 9.6 oz (102.8 kg) SpO2 BMI OB Status Smoking Status 97% 33.46 kg/m2 Postmenopausal Never Smoker Vitals History BMI and BSA Data Body Mass Index Body Surface Area  
 33.46 kg/m 2 2.24 m 2 Preferred Pharmacy Pharmacy Name Phone [facility]/PHARMACY #[id number][address] [place] [phone] [place] [phone] Your Updated Medication List  
  
   
This list is accurate as of 8/14/18  3:30 PM.  Always use your most recent med list. amLODIPine 2.5 mg tablet Commonly known as:  Suzon Salle TAKE 2 TABLETS BY MOUTH DAILY  
  
 aspirin 81 mg tablet Take 162 mg by mouth daily. cranberry extract-vitamin C 250-60 mg Cap Commonly known as:  AZO CRANBERRY PLUS VIT C Take 1 Tab by mouth daily. furosemide 20 mg tablet Commonly known as:  LASIX Take 1 Tab by mouth daily as needed. LORazepam 0.5 mg tablet Commonly known as:  ATIVAN  
TAKE 1 TABLET BY MOUTH TWICE A DAY AS NEEDED FOR ANXIETY  
  
 multivitamin tablet Commonly known as:  ONE A DAY Take 1 Tab by mouth daily. nitrofurantoin 50 mg capsule Commonly known as:  MACRODANTIN Take 50 mg by mouth nightly. 7/24 to 8/22/2018 OMEGA 3 FISH OIL PO Take  by mouth two (2) times a day. Indications: 1220 mg fish oil 360 mg omega-e PEPCID 20 mg tablet Generic drug:  famotidine Take 20 mg by mouth two (2) times a day. potassium chloride SR 8 mEq tablet Commonly known as:  KLOR-CON 8 Take 2 tablets daily as needed (when taking furosemide). pravastatin 40 mg tablet Commonly known as:  PRAVACHOL  
TAKE ONE TABLET BY MOUTH DAILY **YELLOW OBLONG TABLET**  
  
 spironolactone 25 mg tablet Commonly known as:  ALDACTONE Take 1 Tab by mouth two (2) times a day. VITAMIN D3 1,000 unit Cap Generic drug:  cholecalciferol Take  by mouth daily. ZyrTEC 10 mg tablet Generic drug:  cetirizine Take 10 mg by mouth daily. Patient Instructions STOP taking Fish oil supplement 10 days before surgery STOP taking Aspirin 14 days before surgery Introducing [facility]! Dear Jacob Apo: Thank you for requesting a SwypeShield account. Our records indicate that you already have an active SwypeShield account. You can access your account anytime at [email or web address] Did you know that you can access your hospital and ER discharge instructions at any time in SwypeShield? You can also review all of your test results from your hospital stay or ER visit. Additional Information If you have questions, please visit the Frequently Asked Questions section of the SwypeShield website at [email or web address] Remember, SwypeShield is NOT to be used for urgent needs. For medical emergencies, dial 911. Now available from your iPhone and Android! Please provide this summary of care documentation to your next provider. Your primary care clinician is listed as Oliver Tang. If you have any questions after today's visit, please call [phone].

## 2018-08-14 NOTE — PROGRESS NOTES
69 yo female here for pre op eval for LLL Conjunctivoplasty & adjacent tissue reconstruction on 9/4/2018 at [facility] [place] by Dr. Isabel Krishnan. See scanned in form. Copies of labs brought in and will be sent to scanning.

## 2018-08-14 NOTE — PATIENT INSTRUCTIONS
STOP taking Fish oil supplement 10 days before surgery     STOP taking Aspirin 14 days before surgery

## 2018-08-15 ENCOUNTER — TELEPHONE (OUTPATIENT)
Dept: CARDIOLOGY CLINIC | Age: 63
End: 2018-08-15

## 2018-08-15 NOTE — TELEPHONE ENCOUNTER
Pt has eye surgery scheduled for 9/4/18 and she is supposed to discontinue aspirin for two weeks prior and no fish oil 10 days prior to her surgery. She wants to make sure that is okay with Dr. Perri Rodriguez.    Phone: [phone]

## 2018-08-16 NOTE — TELEPHONE ENCOUNTER
Pt following up on her call from yesterday. Pt states that she needs be off her aspirin by Monday and she's getting anxious. Pt can be reached @ [phone].      Thanks

## 2018-08-16 NOTE — TELEPHONE ENCOUNTER
The following message given to patient per Dr. Luis Hidalgo:    MD Grace Gabriel, RN        Caller: Unspecified Ainsley Alfa,  4:13 PM)                     That is fine she can stop her aspirin and fish oil for the procedures.  Restart the day after      2 pt identifiers used

## 2018-10-29 ENCOUNTER — PATIENT MESSAGE (OUTPATIENT)
Dept: INTERNAL MEDICINE CLINIC | Age: 63
End: 2018-10-29

## 2018-10-30 NOTE — TELEPHONE ENCOUNTER
From: Vincent Body  To: Shira Joseph MD  Sent: 10/29/2018 6:08 PM EDT  Subject: Update Medical Information    Can you please update my medical information:  On 10/28/18 I had a Fluzone high dose 2018-19 0.5 injection SYR at [facility]  On 10/28/18 I also had a Prevnar 13 injection 0.5ML at [facility]  [phone]

## 2018-10-31 ENCOUNTER — DOCUMENTATION ONLY (OUTPATIENT)
Dept: INTERNAL MEDICINE CLINIC | Age: 63
End: 2018-10-31

## 2018-10-31 NOTE — PROGRESS NOTES
Received fax from [facility] Harriett Garcia with documentation of administration of immunization(s), including: Prevnar 13. Chart updated to reflect, and placed to be scanned into chart.

## 2018-12-13 RX ORDER — SPIRONOLACTONE 25 MG/1
TABLET ORAL
Qty: 60 TAB | Refills: 6 | Status: SHIPPED | OUTPATIENT
Start: 2018-12-13 | End: 2019-07-12 | Stop reason: SDUPTHER

## 2019-05-15 ENCOUNTER — PATIENT MESSAGE (OUTPATIENT)
Dept: CARDIOLOGY CLINIC | Age: 64
End: 2019-05-15

## 2019-05-15 DIAGNOSIS — R73.03 PREDIABETES: ICD-10-CM

## 2019-05-15 DIAGNOSIS — I47.1 PSVT (PAROXYSMAL SUPRAVENTRICULAR TACHYCARDIA) (HCC): ICD-10-CM

## 2019-05-15 DIAGNOSIS — E88.81 INSULIN RESISTANCE: ICD-10-CM

## 2019-05-15 DIAGNOSIS — E55.9 VITAMIN D DEFICIENCY: ICD-10-CM

## 2019-05-15 DIAGNOSIS — I25.10 ATHEROSCLEROSIS OF NATIVE CORONARY ARTERY OF NATIVE HEART WITHOUT ANGINA PECTORIS: Primary | ICD-10-CM

## 2019-05-15 DIAGNOSIS — E78.5 HYPERLIPIDEMIA, UNSPECIFIED HYPERLIPIDEMIA TYPE: ICD-10-CM

## 2019-05-15 DIAGNOSIS — I10 ESSENTIAL HYPERTENSION: ICD-10-CM

## 2019-05-15 DIAGNOSIS — E66.3 OVERWEIGHT (BMI 25.0-29.9): ICD-10-CM

## 2019-06-16 RX ORDER — AMLODIPINE BESYLATE 2.5 MG/1
TABLET ORAL
Qty: 180 TAB | Refills: 6 | Status: SHIPPED | OUTPATIENT
Start: 2019-06-16 | End: 2020-06-23

## 2019-07-12 RX ORDER — SPIRONOLACTONE 25 MG/1
TABLET ORAL
Qty: 60 TAB | Refills: 6 | Status: CANCELLED | OUTPATIENT
Start: 2019-07-12

## 2019-07-12 RX ORDER — SPIRONOLACTONE 25 MG/1
TABLET ORAL
Qty: 180 TAB | Refills: 3 | Status: SHIPPED | OUTPATIENT
Start: 2019-07-12 | End: 2020-07-01 | Stop reason: SDUPTHER

## 2019-07-17 ENCOUNTER — OFFICE VISIT (OUTPATIENT)
Dept: PRIMARY CARE CLINIC | Age: 64
End: 2019-07-17

## 2019-07-17 ENCOUNTER — HOSPITAL ENCOUNTER (OUTPATIENT)
Dept: GENERAL RADIOLOGY | Age: 64
Discharge: HOME | End: 2019-07-17
Payer: MEDICARE

## 2019-07-17 VITALS
HEART RATE: 87 BPM | BODY MASS INDEX: 33.77 KG/M2 | WEIGHT: 228 LBS | RESPIRATION RATE: 16 BRPM | HEIGHT: 69 IN | SYSTOLIC BLOOD PRESSURE: 133 MMHG | DIASTOLIC BLOOD PRESSURE: 79 MMHG | TEMPERATURE: 98.1 F | OXYGEN SATURATION: 95 %

## 2019-07-17 DIAGNOSIS — R06.2 WHEEZING: ICD-10-CM

## 2019-07-17 DIAGNOSIS — J20.9 ACUTE BRONCHITIS, UNSPECIFIED ORGANISM: Primary | ICD-10-CM

## 2019-07-17 DIAGNOSIS — I25.10 ATHEROSCLEROSIS OF NATIVE CORONARY ARTERY OF NATIVE HEART WITHOUT ANGINA PECTORIS: ICD-10-CM

## 2019-07-17 DIAGNOSIS — I10 ESSENTIAL HYPERTENSION: ICD-10-CM

## 2019-07-17 DIAGNOSIS — E78.5 HYPERLIPIDEMIA LDL GOAL <70: ICD-10-CM

## 2019-07-17 DIAGNOSIS — J20.9 ACUTE BRONCHITIS, UNSPECIFIED ORGANISM: ICD-10-CM

## 2019-07-17 DIAGNOSIS — R05.9 COUGH: ICD-10-CM

## 2019-07-17 LAB
25(OH)D3 SERPL-MCNC: 39 NG/ML (ref 30–100)
ALB/GLOBRATIO, 58C: 1.5 (CALC) (ref 1–2.5)
ALBUMIN SERPL-MCNC: 4.2 G/DL (ref 3.6–5.1)
ALP SERPL-CCNC: 72 U/L (ref 33–130)
ALT SERPL-CCNC: 22 U/L (ref 6–29)
AST SERPL W P-5'-P-CCNC: 22 U/L (ref 10–35)
BILIRUB SERPL-MCNC: 1 MG/DL (ref 0.2–1.2)
BUN SERPL-MCNC: 14 MG/DL (ref 7–25)
BUN/CREATININE RATIO,BUCR: NORMAL (CALC) (ref 6–22)
CALCIUM SERPL-MCNC: 9.7 MG/DL (ref 8.6–10.4)
CHLORIDE SERPL-SCNC: 101 MMOL/L (ref 98–110)
CHOL/HDL RATIO,CHHDX: 3.4 (CALC)
CHOLEST SERPL-MCNC: 146 MG/DL
CO2 SERPL-SCNC: 27 MMOL/L (ref 20–32)
CREAT SERPL-MCNC: 0.71 MG/DL (ref 0.5–0.99)
EAG (MG/DL),9916804: 105 (CALC)
EAG (MMOL/L),9916805: 5.8 (CALC)
ERYTHROCYTE [DISTWIDTH] IN BLOOD BY AUTOMATED COUNT: 12.4 % (ref 11–15)
GLOBULIN,GLOB: 2.8 G/DL (CALC) (ref 1.9–3.7)
GLUCOSE SERPL-MCNC: 93 MG/DL (ref 65–99)
HBA1C MFR BLD HPLC: 5.3 % OF TOTAL HGB
HCT VFR BLD AUTO: 43.7 % (ref 35–45)
HDLC SERPL-MCNC: 43 MG/DL
HGB BLD-MCNC: 15.2 G/DL (ref 11.7–15.5)
LDL-CHOLESTEROL: 85 MG/DL (CALC)
MAGNESIUM SERPL-MCNC: 2.1 MG/DL (ref 1.5–2.5)
MCH RBC QN AUTO: 32 PG (ref 27–33)
MCHC RBC AUTO-ENTMCNC: 34.8 G/DL (ref 32–36)
MCV RBC AUTO: 92 FL (ref 80–100)
NON-HDL CHOLESTEROL, 011976: 103 MG/DL (CALC)
PLATELET # BLD AUTO: 277 THOUSAND/UL (ref 140–400)
PMV BLD AUTO: 11.8 FL (ref 7.5–12.5)
POTASSIUM SERPL-SCNC: 4.2 MMOL/L (ref 3.5–5.3)
PROT SERPL-MCNC: 7 G/DL (ref 6.1–8.1)
RBC # BLD AUTO: 4.75 MILLION/UL (ref 3.8–5.1)
SODIUM SERPL-SCNC: 138 MMOL/L (ref 135–146)
TRIGL SERPL-MCNC: 90 MG/DL (ref ?–150)
WBC # BLD AUTO: 8.1 THOUSAND/UL (ref 3.8–10.8)

## 2019-07-17 PROCEDURE — 71046 X-RAY EXAM CHEST 2 VIEWS: CPT

## 2019-07-17 RX ORDER — ALBUTEROL SULFATE 90 UG/1
2 AEROSOL, METERED RESPIRATORY (INHALATION)
Qty: 1 INHALER | Refills: 0 | Status: SHIPPED | OUTPATIENT
Start: 2019-07-17 | End: 2020-02-05 | Stop reason: SDUPTHER

## 2019-07-17 RX ORDER — METHYLPREDNISOLONE 4 MG/1
TABLET ORAL
Qty: 1 DOSE PACK | Refills: 0 | Status: SHIPPED | OUTPATIENT
Start: 2019-07-17 | End: 2019-07-31 | Stop reason: ALTCHOICE

## 2019-07-17 RX ORDER — BENZONATATE 200 MG/1
200 CAPSULE ORAL
Qty: 21 CAP | Refills: 0 | Status: SHIPPED | OUTPATIENT
Start: 2019-07-17 | End: 2019-07-24

## 2019-07-17 NOTE — PROGRESS NOTES
Chief Complaint   Patient presents with   [address]     new patient, cough , temp 102.4,         1. Have you been to the ER, urgent care clinic since your last visit? Hospitalized since your last visit? no    2. Have you seen or consulted any other health care providers outside of the [address] [place] [address] since your last visit? Include any pap smears or colon screening.   no

## 2019-07-17 NOTE — TELEPHONE ENCOUNTER
Patient has an appt already scheduled for this Friday  Future Appointments  7/19/2019  10:00 AM   Martha Noonan MD  [facility]  7/31/2019  9:30 AM    Balbir Ortiz MD          [facility]

## 2019-07-17 NOTE — TELEPHONE ENCOUNTER
Labs look good but LDL still slightly elevated, goal LDL < 70 for CAD, discussed with Dr. Nany Rebollar - please advise her to stop pravastatin and begin rosuvastatin 20mg daily. Will reassess lipids in 3 months.

## 2019-07-17 NOTE — PROGRESS NOTES
Subjective:     Chief Complaint   Patient presents with   [facility]     new patient, cough , temp 102.4,        She  is a 75y.o. year old female who presents for evaluation of ***    Cough: started with chills about a week ago . following day she had fever for 48 hrs. Next day she woke up with cough. Cough is non productive. Denies any chest pain. Delsym . {Ros - complete:83752}  Objective:     Vitals:    07/17/19 0929   BP: 133/79   Pulse: 87   Resp: 16   Temp: 98.1 °F (36.7 °C)   TempSrc: Oral   SpO2: 95%   Weight: 228 lb (103.4 kg)   Height: 5' 9\" (1.753 m)       Physical Examination: {PE ADULT/PEDS  MALE/FEMALE:43681}    Allergies   Allergen Reactions    Sulfa (Sulfonamide Antibiotics) Rash     Generalized rash/hives    Boniva [Ibandronate] Other (comments)     Dysphagia      Carac [Fluorouracil] Cough and Other (comments)     Cogestion    Cozaar [Losartan] Shortness of Breath    Lisinopril Cough    Preservative Other (comments)     Preservative in eye drops make eyes red      Red Dye Rash      Social History     Socioeconomic History    Marital status:      Spouse name: Not on file    Number of children: Not on file    Years of education: Not on file    Highest education level: Not on file   Tobacco Use    Smoking status: Never Smoker    Smokeless tobacco: Never Used   Substance and Sexual Activity    Alcohol use:  Yes     Alcohol/week: 0.0 standard drinks     Comment: rarely    Drug use: No    Sexual activity: Yes     Partners: Male      Family History   Problem Relation Age of Onset    Cancer Father         lung (+tob)    Thyroid Disease Mother       Past Surgical History:   Procedure Laterality Date    ENDOSCOPY, COLON, DIAGNOSTIC  5/2003 , 2013 & 2018    (Brand)-due in 10 yrs for Colon/ 2018 upper Endos - nl    HX COLONOSCOPY  06/2018    Brand - repeat in 10 years    HX DILATION AND CURETTAGE  1990's    HX HEENT  1958    T&A    HX HEENT Left 2010    left eyelid surg. - lower lid reconstruction after MOHS for basal cell    HX MOHS PROCEDURES      L ankle, R posterior calf, and L lower eyelid      Past Medical History:   Diagnosis Date    Calculus of kidney 04/2018    on CT scan - asymptomatic    Chronic venous insufficiency     fluid retention    DDD (degenerative disc disease)     GERD (gastroesophageal reflux disease)     H/O hiatal hernia     gastritis    History of actinic keratosis     basal cell - OS lower lid    Hx of squamous cell carcinoma     L lateral ankle and R posterior calf    Hypertension       Current Outpatient Medications   Medication Sig Dispense Refill    spironolactone (ALDACTONE) 25 mg tablet TAKE ONE TABLET BY MOUTH TWICE A  Tab 3    amLODIPine (NORVASC) 2.5 mg tablet Take 2 Tabs by mouth daily. 180 Tab 0    pravastatin (PRAVACHOL) 40 mg tablet TAKE ONE TABLET BY MOUTH DAILY **YELLOW OBLONG TABLET** 90 Tab 3    potassium chloride SR (KLOR-CON 8) 8 mEq tablet Take 2 tablets daily as needed (when taking furosemide). 60 Tab 1    furosemide (LASIX) 20 mg tablet Take 1 Tab by mouth daily as needed. 30 Tab 8    famotidine (PEPCID) 20 mg tablet Take 20 mg by mouth two (2) times a day.  LORazepam (ATIVAN) 0.5 mg tablet TAKE 1 TABLET BY MOUTH TWICE A DAY AS NEEDED FOR ANXIETY 60 Tab 3    OMEGA-3 FATTY ACIDS/FISH OIL (OMEGA 3 FISH OIL PO) Take  by mouth two (2) times a day. Indications: 1220 mg fish oil 360 mg omega-e      cholecalciferol (VITAMIN D3) 1,000 unit cap Take  by mouth daily.  multivitamin (ONE A DAY) tablet Take 1 Tab by mouth daily.  cranberry extract-vit C (AZO CRANBERRY PLUS VIT C) 250-60 mg cap Take 1 Tab by mouth daily. 30 Tab 0    aspirin 81 mg tablet Take 162 mg by mouth daily.  amLODIPine (NORVASC) 2.5 mg tablet TAKE TWO TABLETS BY MOUTH DAILY 180 Tab 6    nitrofurantoin (MACRODANTIN) 50 mg capsule Take 50 mg by mouth nightly.  7/24 to 8/22/2018  3    cetirizine (ZYRTEC) 10 mg tablet Take 10 mg by mouth daily. Assessment/ Plan:   {There are no diagnoses linked to this encounter. (Refresh or delete this SmartLink)}       Medication risks/benefits/costs/interactions/alternatives discussed with patient. Advised patient to call back or return to office if symptoms worsen/change/persist. If patient cannot reach us or should anything more severe/urgent arise he/she should proceed directly to the nearest emergency department. Discussed expected course/resolution/complications of diagnosis in detail with patient. Patient given a written after visit summary which includes her diagnoses, current medications and vitals. Patient expressed understanding with the diagnosis and plan.

## 2019-07-17 NOTE — PROGRESS NOTES
Subjective:     Chief Complaint   Patient presents with   [employer] Establish [employer]     new patient, cough , temp 102.4,        She  is a 75y.o. year old female who presents today as a new patient to establish as well as some acute concerns. Her medical hx is significant for CAD, PVC, HTN, HLD, osteopenia,     Patient reports that cough started with chills about a week ago with a fever of 102.4 for  48 hrs. On third days  she woke up with cough. Cough is non productive. Denies any chest pain. Feels soa with coughing spells as well as hear wheezing with deep breathing. Reports that she has been taking  Delsym for cough but nothing helping. No fever or chills for the past 4 days. Patient reports that she experience intermittent cough few times in a  year. She was evaluated by allergist and pulmonologist in the past. Was told that she allergic asthmatic  bronchitis    CAD, PVC, HTN:  Well controlled with current rx. Has been following up with cardiologist  Dr. George Bill regularly. HLD: currently on Pravachol 40 mg daily. Lab Results   Component Value Date/Time    Cholesterol, total 146 07/16/2019 11:35 AM    HDL Cholesterol 43 (L) 07/16/2019 11:35 AM    LDL-CHOLESTEROL 85 07/16/2019 11:35 AM    LDL, calculated 83 02/11/2016 10:27 AM    VLDL, calculated 24 02/11/2016 10:27 AM    Triglyceride 90 07/16/2019 11:35 AM    Cholesterol/HDL ratio 3.4 07/16/2019 11:35 AM         A comprehensive review of systems was negative except for that written in the HPI.   Objective:     Vitals:    07/17/19 0929   BP: 133/79   Pulse: 87   Resp: 16   Temp: 98.1 °F (36.7 °C)   TempSrc: Oral   SpO2: 95%   Weight: 228 lb (103.4 kg)   Height: 5' 9\" (1.753 m)       Physical Examination: General appearance - alert, well appearing, and in no distress, oriented to person, place, and time and overweight  Mental status - alert, oriented to person, place, and time, normal mood, behavior, speech, dress, motor activity, and thought processes  Ears - mild serous fluid in left ear otherwise both ear looks fine. Nose - Deviated nasal septum. Mouth - mucous membranes moist, pharynx normal without lesions  Neck - supple, no significant adenopathy  Chest - expiratory wheezing noted in right lung field. Heart - normal rate, regular rhythm, normal S1, S2, no murmurs, rubs, clicks or gallops  Extremities - no pedal edema noted    Allergies   Allergen Reactions    Sulfa (Sulfonamide Antibiotics) Rash     Generalized rash/hives    Boniva [Ibandronate] Other (comments)     Dysphagia      Carac [Fluorouracil] Cough and Other (comments)     Cogestion    Cozaar [Losartan] Shortness of Breath    Lisinopril Cough    Preservative Other (comments)     Preservative in eye drops make eyes red      Red Dye Rash      Social History     Socioeconomic History    Marital status:      Spouse name: Not on file    Number of children: Not on file    Years of education: Not on file    Highest education level: Not on file   Tobacco Use    Smoking status: Never Smoker    Smokeless tobacco: Never Used   Substance and Sexual Activity    Alcohol use:  Yes     Alcohol/week: 0.0 standard drinks     Comment: rarely    Drug use: No    Sexual activity: Yes     Partners: Male      Family History   Problem Relation Age of Onset    Cancer Father         lung (+tob)    Thyroid Disease Mother       Past Surgical History:   Procedure Laterality Date    ENDOSCOPY, COLON, DIAGNOSTIC  5/2003 , 2013 & 2018    (Brand)-due in 10 yrs for Colon/ 2018 upper Endos - nl    HX COLONOSCOPY  06/2018    Brand - repeat in 10 years    HX DILATION AND CURETTAGE  1990's    HX HEENT  1958    T&A    HX HEENT Left 2010    left eyelid surg. - lower lid reconstruction after MOHS for basal cell    HX MOHS PROCEDURES      L ankle, R posterior calf, and L lower eyelid      Past Medical History:   Diagnosis Date    Calculus of kidney 04/2018    on CT scan - asymptomatic    Chronic venous insufficiency     fluid retention    DDD (degenerative disc disease)     GERD (gastroesophageal reflux disease)     H/O hiatal hernia     gastritis    History of actinic keratosis     basal cell - OS lower lid    Hx of squamous cell carcinoma     L lateral ankle and R posterior calf    Hypertension       Current Outpatient Medications   Medication Sig Dispense Refill    spironolactone (ALDACTONE) 25 mg tablet TAKE ONE TABLET BY MOUTH TWICE A  Tab 3    amLODIPine (NORVASC) 2.5 mg tablet Take 2 Tabs by mouth daily. 180 Tab 0    pravastatin (PRAVACHOL) 40 mg tablet TAKE ONE TABLET BY MOUTH DAILY **YELLOW OBLONG TABLET** 90 Tab 3    potassium chloride SR (KLOR-CON 8) 8 mEq tablet Take 2 tablets daily as needed (when taking furosemide). 60 Tab 1    furosemide (LASIX) 20 mg tablet Take 1 Tab by mouth daily as needed. 30 Tab 8    famotidine (PEPCID) 20 mg tablet Take 20 mg by mouth two (2) times a day.  LORazepam (ATIVAN) 0.5 mg tablet TAKE 1 TABLET BY MOUTH TWICE A DAY AS NEEDED FOR ANXIETY 60 Tab 3    OMEGA-3 FATTY ACIDS/FISH OIL (OMEGA 3 FISH OIL PO) Take  by mouth two (2) times a day. Indications: 1220 mg fish oil 360 mg omega-e      cholecalciferol (VITAMIN D3) 1,000 unit cap Take  by mouth daily.  multivitamin (ONE A DAY) tablet Take 1 Tab by mouth daily.  cranberry extract-vit C (AZO CRANBERRY PLUS VIT C) 250-60 mg cap Take 1 Tab by mouth daily. 30 Tab 0    aspirin 81 mg tablet Take 162 mg by mouth daily.  amLODIPine (NORVASC) 2.5 mg tablet TAKE TWO TABLETS BY MOUTH DAILY 180 Tab 6    nitrofurantoin (MACRODANTIN) 50 mg capsule Take 50 mg by mouth nightly. 7/24 to 8/22/2018  3    cetirizine (ZYRTEC) 10 mg tablet Take 10 mg by mouth daily. Assessment/ Plan:   Diagnoses and all orders for this visit:    1. Acute bronchitis, unspecified organism  -     XR CHEST PA LAT; Future is negative for pneumonia. -     methylPREDNISolone (MEDROL DOSEPACK) 4 mg tablet;  Follow pack instruction. -     albuterol (PROVENTIL HFA, VENTOLIN HFA, PROAIR HFA) 90 mcg/actuation inhaler; Take 2 Puffs by inhalation every six (6) hours as needed for Wheezing.  -     benzonatate (TESSALON) 200 mg capsule; Take 1 Cap by mouth three (3) times daily as needed for Cough for up to 7 days. 2. Wheezing  -     benzonatate (TESSALON) 200 mg capsule; Take 1 Cap by mouth three (3) times daily as needed for Cough for up to 7 days. 3. Cough  -     benzonatate (TESSALON) 200 mg capsule; Take 1 Cap by mouth three (3) times daily as needed for Cough for up to 7 days. 4. Atherosclerosis of native coronary artery of native heart without angina pectoris        -    Continue heart healthy det, aspiring , cholesterol med. 5. Essential hypertension       - well controlled. 6. Hyperlipidemia LDL goal <70           Medication risks/benefits/costs/interactions/alternatives discussed with patient. Advised patient to call back or return to office if symptoms worsen/change/persist. If patient cannot reach us or should anything more severe/urgent arise he/she should proceed directly to the nearest emergency department. Discussed expected course/resolution/complications of diagnosis in detail with patient. Patient given a written after visit summary which includes her diagnoses, current medications and vitals. Patient expressed understanding with the diagnosis and plan. Follow-up and Dispositions    · Return in about 2 weeks (around 7/31/2019) for medicarewel ness and follow up cough.

## 2019-07-17 NOTE — PROGRESS NOTES
Result sent through my chart. Pt aware. Ike Ms. Nickolas Whitney,    Just received Xray report . Xray did not show any pneumonia or any acute findings. Please continue med as prescribed today and follow up as advised. Hope you feel better. Thanks.   Dr. Love Pay

## 2019-07-19 ENCOUNTER — OFFICE VISIT (OUTPATIENT)
Dept: CARDIOLOGY CLINIC | Age: 64
End: 2019-07-19

## 2019-07-19 VITALS
DIASTOLIC BLOOD PRESSURE: 65 MMHG | HEART RATE: 86 BPM | HEIGHT: 69 IN | SYSTOLIC BLOOD PRESSURE: 136 MMHG | RESPIRATION RATE: 16 BRPM | WEIGHT: 224.8 LBS | OXYGEN SATURATION: 97 % | BODY MASS INDEX: 33.3 KG/M2

## 2019-07-19 DIAGNOSIS — E78.5 HYPERLIPIDEMIA LDL GOAL <70: ICD-10-CM

## 2019-07-19 DIAGNOSIS — I47.1 PSVT (PAROXYSMAL SUPRAVENTRICULAR TACHYCARDIA) (HCC): ICD-10-CM

## 2019-07-19 DIAGNOSIS — E78.5 HYPERLIPIDEMIA, UNSPECIFIED HYPERLIPIDEMIA TYPE: ICD-10-CM

## 2019-07-19 DIAGNOSIS — I49.3 PVC (PREMATURE VENTRICULAR CONTRACTION): ICD-10-CM

## 2019-07-19 DIAGNOSIS — I10 ESSENTIAL HYPERTENSION: ICD-10-CM

## 2019-07-19 DIAGNOSIS — I25.10 ATHEROSCLEROSIS OF NATIVE CORONARY ARTERY OF NATIVE HEART WITHOUT ANGINA PECTORIS: Primary | ICD-10-CM

## 2019-07-19 NOTE — PROGRESS NOTES
[facility] [place]:   [phone]    HPI: Socorro Nelson, a 75y.o. year-old who presents for follow up regarding her palpitations and HTN. She Is feeling pretty good, PVC under control. HDL is down a bit, reviewed her labs. Otherwise doing ok. She hurt her neck and then her knee and has put back on about 6 pounds, HDL coming down with the lack of exercise. She does swelling she sits or stands too long. But otherwise doing ok. She is fighting her weight. But not making progress. She is eating well and not walking like she should. She wants to go to [place] and I think that is fine. BP is good at home and doing well. Reviewed her labs, lipids look great, K is fine, lft ok. Discussed EP eval to see if they can ablate the PVC, but she is not     Doing much better these days, a combination of PVC and anxiety. Previously taken verapamil and bystolic. She is on PPI for GERD and sees Dr. Dany Mclean annually   LE edema minimal   Did have her eyes done with Dr. Flaquito Reilly.    -She gets her labs at [facility]  -Prefers that our office follow her labs    Doing fine on the spironolactone and BP is great today. A/P:  1. CAD- calcium score of 250 2011, continue aspirin and pravastatin  -consider stress testing in 2020 for progression. 2. Hyperlipidemia- LDL near goal, LDL 85  Cont statin  3. Overweight- Body mass index is 33.2 kg/m². encouraged her to exercise  4. PSVT- palpitations well controlled now, could not tolerate Bystolic, previously discussed suppression medications and anti-anxiety medications, not taking Verapamil SR (had been on it in her 30s for SVT at 220bpm but then stopped by Dr. Eliza Mo years later), now doing ok with the change in diuretic so potassium seems to be a culprit  -palpitations worsened by dyazide but for a while doing ok on dyazide/spironolactone/KCL combination, now palps good on spironolactone alone  -PVC were very back in the past but doing ok.    5. HTN- at goal now on amlodipine today and spironolactone  6. Elevated AST/ALT- resolved, will recheck prior to her return visit   7. Insulin Resistance-  continue diet and exercise   8. Vit D deficiency- on 1000 units daily   9. Hypokalemia - stable now, on supplements  10. Edema - well controlled now on spironolactone and dyazide  11. GERD - on Prilosec, followed by GI/Dr. Aj Kim    Stress Echo 7/17 Exe 8:00  no WMA  Stress echo 7/15 exe 9:00, EF 60%, no WMA  Echo 7/15 EF 65%, normal  Stress nuc 2009 nl EF 67%  Ca Score 2011 250    Fhx +CAD    She  has a past medical history of Calculus of kidney (04/2018), Chronic venous insufficiency, DDD (degenerative disc disease), GERD (gastroesophageal reflux disease), H/O hiatal hernia, History of actinic keratosis, squamous cell carcinoma, and Hypertension. Cardiovascular ROS: no chest pain or dyspnea on exertion, positive for palpitations  Respiratory ROS: no cough, shortness of breath, or wheezing  Neurological ROS: no TIA or stroke symptoms  All other systems negative except as above. PE  Vitals:    07/19/19 0952   BP: 136/65   Pulse: 86   Resp: 16   SpO2: 97%   Weight: 224 lb 12.8 oz (102 kg)   Height: 5' 9\" (1.753 m)    Body mass index is 33.2 kg/m².   General appearance - alert, well appearing, and in no distress  Mental status - affect appropriate to mood  Eyes - sclera anicteric, moist mucous membranes  Neck - supple  Lymphatics - not assessed   Chest - clear to auscultation, no wheezes, rales or rhonchi  Heart - normal rate, regular rhythm, normal S1, S2, no murmurs, rubs, clicks or gallops  Abdomen - soft, nontender, nondistended  Back exam - full range of motion, no tenderness  Neurological - cranial nerves II through XII grossly intact, no focal deficit  Musculoskeletal - no muscular tenderness noted, normal strength  Extremities - peripheral pulses normal, trace LE edema  Skin - normal coloration  no rashes    12 lead ECG: NSR    Recent Labs:  Lab Results Component Value Date/Time    Cholesterol, total 146 07/16/2019 11:35 AM    HDL Cholesterol 43 (L) 07/16/2019 11:35 AM    LDL-CHOLESTEROL 85 07/16/2019 11:35 AM    LDL, calculated 83 02/11/2016 10:27 AM    Triglyceride 90 07/16/2019 11:35 AM    Cholesterol/HDL ratio 3.4 07/16/2019 11:35 AM     Lab Results   Component Value Date/Time    Creatinine 0.71 07/16/2019 11:35 AM     Lab Results   Component Value Date/Time    BUN 14 07/16/2019 11:35 AM     Lab Results   Component Value Date/Time    Potassium 4.2 07/16/2019 11:35 AM     Lab Results   Component Value Date/Time    Hemoglobin A1c 5.3 07/16/2019 11:35 AM     No components found for: HGBI,  IHGB,  HGB,  HGBP,  WBHGB  Lab Results   Component Value Date/Time    PLATELET 697 46/96/4543 11:35 AM       Reviewed:  Past Medical History:   Diagnosis Date    Calculus of kidney 04/2018    on CT scan - asymptomatic    Chronic venous insufficiency     fluid retention    DDD (degenerative disc disease)     GERD (gastroesophageal reflux disease)     H/O hiatal hernia     gastritis    History of actinic keratosis     basal cell - OS lower lid    Hx of squamous cell carcinoma     L lateral ankle and R posterior calf    Hypertension      Social History     Tobacco Use   Smoking Status Never Smoker   Smokeless Tobacco Never Used     Social History     Substance and Sexual Activity   Alcohol Use Yes    Alcohol/week: 0.0 standard drinks    Comment: rarely     Allergies   Allergen Reactions    Sulfa (Sulfonamide Antibiotics) Rash     Generalized rash/hives    Boniva [Ibandronate] Other (comments)     Dysphagia      Carac [Fluorouracil] Cough and Other (comments)     Cogestion    Cozaar [Losartan] Shortness of Breath    Lisinopril Cough    Preservative Other (comments)     Preservative in eye drops make eyes red      Red Dye Rash       Current Outpatient Medications   Medication Sig    methylPREDNISolone (MEDROL DOSEPACK) 4 mg tablet Follow pack instruction.     albuterol (PROVENTIL HFA, VENTOLIN HFA, PROAIR HFA) 90 mcg/actuation inhaler Take 2 Puffs by inhalation every six (6) hours as needed for Wheezing.  benzonatate (TESSALON) 200 mg capsule Take 1 Cap by mouth three (3) times daily as needed for Cough for up to 7 days.  spironolactone (ALDACTONE) 25 mg tablet TAKE ONE TABLET BY MOUTH TWICE A DAY    amLODIPine (NORVASC) 2.5 mg tablet TAKE TWO TABLETS BY MOUTH DAILY    pravastatin (PRAVACHOL) 40 mg tablet TAKE ONE TABLET BY MOUTH DAILY **YELLOW OBLONG TABLET**    potassium chloride SR (KLOR-CON 8) 8 mEq tablet Take 2 tablets daily as needed (when taking furosemide).  furosemide (LASIX) 20 mg tablet Take 1 Tab by mouth daily as needed.  famotidine (PEPCID) 20 mg tablet Take 20 mg by mouth two (2) times a day.  LORazepam (ATIVAN) 0.5 mg tablet TAKE 1 TABLET BY MOUTH TWICE A DAY AS NEEDED FOR ANXIETY    OMEGA-3 FATTY ACIDS/FISH OIL (OMEGA 3 FISH OIL PO) Take  by mouth two (2) times a day. Indications: 1220 mg fish oil 360 mg omega-e    cholecalciferol (VITAMIN D3) 1,000 unit cap Take  by mouth daily.  multivitamin (ONE A DAY) tablet Take 1 Tab by mouth daily.  cranberry extract-vit C (AZO CRANBERRY PLUS VIT C) 250-60 mg cap Take 1 Tab by mouth daily.  aspirin 81 mg tablet Take 162 mg by mouth daily.  cetirizine (ZYRTEC) 10 mg tablet Take 10 mg by mouth daily. No current facility-administered medications for this visit.         [facility] [place]  [address], [address] [place] [address]

## 2019-07-31 ENCOUNTER — OFFICE VISIT (OUTPATIENT)
Dept: PRIMARY CARE CLINIC | Age: 64
End: 2019-07-31

## 2019-07-31 VITALS
SYSTOLIC BLOOD PRESSURE: 135 MMHG | WEIGHT: 231 LBS | OXYGEN SATURATION: 100 % | HEIGHT: 69 IN | HEART RATE: 88 BPM | DIASTOLIC BLOOD PRESSURE: 75 MMHG | TEMPERATURE: 98.6 F | BODY MASS INDEX: 34.21 KG/M2 | RESPIRATION RATE: 18 BRPM

## 2019-07-31 DIAGNOSIS — I10 ESSENTIAL HYPERTENSION: ICD-10-CM

## 2019-07-31 DIAGNOSIS — Z13.39 SCREENING FOR ALCOHOLISM: ICD-10-CM

## 2019-07-31 DIAGNOSIS — R05.3 CHRONIC COUGHING: ICD-10-CM

## 2019-07-31 DIAGNOSIS — Z00.00 MEDICARE ANNUAL WELLNESS VISIT, SUBSEQUENT: Primary | ICD-10-CM

## 2019-07-31 DIAGNOSIS — Z13.31 SCREENING FOR DEPRESSION: ICD-10-CM

## 2019-07-31 RX ORDER — MONTELUKAST SODIUM 10 MG/1
10 TABLET ORAL DAILY
Qty: 30 TAB | Refills: 1 | Status: SHIPPED | OUTPATIENT
Start: 2019-07-31 | End: 2020-06-22

## 2019-07-31 RX ORDER — FLUTICASONE PROPIONATE AND SALMETEROL 250; 50 UG/1; UG/1
1 POWDER RESPIRATORY (INHALATION) EVERY 12 HOURS
Qty: 1 INHALER | Refills: 1 | Status: SHIPPED | OUTPATIENT
Start: 2019-07-31 | End: 2019-09-26 | Stop reason: SDUPTHER

## 2019-07-31 NOTE — PATIENT INSTRUCTIONS
Medicare Wellness Visit, Female The best way to live healthy is to have a lifestyle where you eat a well-balanced diet, exercise regularly, limit alcohol use, and quit all forms of tobacco/nicotine, if applicable. Regular preventive services are another way to keep healthy. Preventive services (vaccines, screening tests, monitoring & exams) can help personalize your care plan, which helps you manage your own care. Screening tests can find health problems at the earliest stages, when they are easiest to treat. [facility] follows the current, evidence-based guidelines published by the [facility] Harjit Yajaira ([facility]) when recommending preventive services for our patients. Because we follow these guidelines, sometimes recommendations change over time as research supports it. (For example, mammograms used to be recommended annually. Even though Medicare will still pay for an annual mammogram, the newer guidelines recommend a mammogram every two years for women of average risk.) Of course, you and your doctor may decide to screen more often for some diseases, based on your risk and your health status. Preventive services for you include: - Medicare offers their members a free annual wellness visit, which is time for you and your primary care provider to discuss and plan for your preventive service needs. Take advantage of this benefit every year! 
-All adults over the age of 72 should receive the recommended pneumonia vaccines. Current USPSTF guidelines recommend a series of two vaccines for the best pneumonia protection.  
-All adults should have a flu vaccine yearly and a tetanus vaccine every 10 years. All adults age 61 and older should receive a shingles vaccine once in their lifetime.   
-A bone mass density test is recommended when a woman turns 65 to screen for osteoporosis. This test is only recommended one time, as a screening. Some providers will use this same test as a disease monitoring tool if you already have osteoporosis. -All adults age 38-68 who are overweight should have a diabetes screening test once every three years.  
-Other screening tests and preventive services for persons with diabetes include: an eye exam to screen for diabetic retinopathy, a kidney function test, a foot exam, and stricter control over your cholesterol.  
-Cardiovascular screening for adults with routine risk involves an electrocardiogram (ECG) at intervals determined by your doctor.  
-Colorectal cancer screenings should be done for adults age 54-65 with no increased risk factors for colorectal cancer. There are a number of acceptable methods of screening for this type of cancer. Each test has its own benefits and drawbacks. Discuss with your doctor what is most appropriate for you during your annual wellness visit. The different tests include: colonoscopy (considered the best screening method), a fecal occult blood test, a fecal DNA test, and sigmoidoscopy. -Breast cancer screenings are recommended every other year for women of normal risk, age 54-69. 
-Cervical cancer screenings for women over age 72 are only recommended with certain risk factors.  
-All adults born between [place] should be screened once for Hepatitis C. Here is a list of your current Health Maintenance items (your personalized list of preventive services) with a due date: 
Health Maintenance Due Topic Date Due  Shingles Vaccine (1 of 2) 01/29/2003 [facility] Annual Well Visit  07/06/2019

## 2019-07-31 NOTE — PROGRESS NOTES
This is the Subsequent Medicare Annual Wellness Exam, performed 12 months or more after the Initial AWV or the last Subsequent AWV    I have reviewed the patient's medical history in detail and updated the computerized patient record. She  is a 75y.o. year old female  medical hx is significant for CAD, PVC, HTN, HLD, osteopenia is here for medicare well ness. She is also here with a c/o continue to have coughing spells but improved from last time. Refer to last note for more info. Denies any wheezing, soa, fever, chills. Xray was normal.  History     Past Medical History:   Diagnosis Date    Calculus of kidney 04/2018    on CT scan - asymptomatic    Chronic venous insufficiency     fluid retention    DDD (degenerative disc disease)     GERD (gastroesophageal reflux disease)     H/O hiatal hernia     gastritis    History of actinic keratosis     basal cell - OS lower lid    Hx of squamous cell carcinoma     L lateral ankle and R posterior calf    Hypertension       Past Surgical History:   Procedure Laterality Date    ENDOSCOPY, COLON, DIAGNOSTIC  5/2003 , 2013 & 2018    (Brand)-due in 10 yrs for Colon/ 2018 upper Endos - nl    HX COLONOSCOPY  06/2018    Brand - repeat in 10 years    HX DILATION AND CURETTAGE  1990's    HX HEENT  1958    T&A    HX HEENT Left 2010    left eyelid surg. - lower lid reconstruction after MOHS for basal cell    HX MOHS PROCEDURES      L ankle, R posterior calf, and L lower eyelid     Current Outpatient Medications   Medication Sig Dispense Refill    albuterol (PROVENTIL HFA, VENTOLIN HFA, PROAIR HFA) 90 mcg/actuation inhaler Take 2 Puffs by inhalation every six (6) hours as needed for Wheezing.  1 Inhaler 0    spironolactone (ALDACTONE) 25 mg tablet TAKE ONE TABLET BY MOUTH TWICE A  Tab 3    amLODIPine (NORVASC) 2.5 mg tablet TAKE TWO TABLETS BY MOUTH DAILY 180 Tab 6    pravastatin (PRAVACHOL) 40 mg tablet TAKE ONE TABLET BY MOUTH DAILY **YELLOW OBLONG TABLET** 90 Tab 3    potassium chloride SR (KLOR-CON 8) 8 mEq tablet Take 2 tablets daily as needed (when taking furosemide). 60 Tab 1    furosemide (LASIX) 20 mg tablet Take 1 Tab by mouth daily as needed. 30 Tab 8    famotidine (PEPCID) 20 mg tablet Take 20 mg by mouth two (2) times a day.  LORazepam (ATIVAN) 0.5 mg tablet TAKE 1 TABLET BY MOUTH TWICE A DAY AS NEEDED FOR ANXIETY 60 Tab 3    OMEGA-3 FATTY ACIDS/FISH OIL (OMEGA 3 FISH OIL PO) Take  by mouth two (2) times a day. Indications: 1220 mg fish oil 360 mg omega-e      cholecalciferol (VITAMIN D3) 1,000 unit cap Take  by mouth daily.  multivitamin (ONE A DAY) tablet Take 1 Tab by mouth daily.  cranberry extract-vit C (AZO CRANBERRY PLUS VIT C) 250-60 mg cap Take 1 Tab by mouth daily. 30 Tab 0    aspirin 81 mg tablet Take 81 mg by mouth daily. Allergies   Allergen Reactions    Sulfa (Sulfonamide Antibiotics) Rash     Generalized rash/hives    Boniva [Ibandronate] Other (comments)     Dysphagia      Carac [Fluorouracil] Cough and Other (comments)     Cogestion    Cozaar [Losartan] Shortness of Breath    Lisinopril Cough    Preservative Other (comments)     Preservative in eye drops make eyes red      Red Dye Rash     Family History   Problem Relation Age of Onset    Cancer Father         lung (+tob)    Thyroid Disease Mother      Social History     Tobacco Use    Smoking status: Never Smoker    Smokeless tobacco: Never Used   Substance Use Topics    Alcohol use: Yes     Alcohol/week: 0.0 standard drinks     Comment: rarely     Patient Active Problem List   Diagnosis Code    Coronary atherosclerosis of native coronary artery I25.10    Hyperlipemia E78.5    PSVT (paroxysmal supraventricular tachycardia) (HCC) I47.1    HTN (hypertension) I10    Overweight (BMI 25.0-29. 9) E66.3    Hypokalemia E87.6    Osteopenia M85.80    Postmenopausal atrophic vaginitis N95.2    PVC (premature ventricular contraction) I49.3    Bladder retention of urine R33.9    Hyperlipidemia LDL goal <70 E78.5       Depression Risk Factor Screening:     3 most recent PHQ Screens 7/31/2019   Little interest or pleasure in doing things Not at all   Feeling down, depressed, irritable, or hopeless Not at all   Total Score PHQ 2 0     Alcohol Risk Factor Screening: You do not drink alcohol or very rarely. Functional Ability and Level of Safety:   Hearing Loss  Hearing is good. Activities of Daily Living  The home contains: grab bars and rugs  Patient does total self care    Fall Risk  Fall Risk Assessment, last 12 mths 7/31/2019   Able to walk? Yes   Fall in past 12 months? No       Abuse Screen  Patient is not abused     General: stated age, well developed, well nourished and in NAD  Neck: supple, symmetrical, trachea midline, no adenopathy and thyroid: not enlarged, symmetric, no tenderness/mass/nodules  Lungs:  clear to auscultation w/o rales, rhonchi, wheezes w/normal effort and no use of accessory muscles of respiration . Coughing spells noted during exam.   Heart: regular rate and rhythm, S1, S2 normal, no murmur, click, rub or gallop  Abdomen: soft, nontender, no masses, BS normal  Ext:  No edema noted.    Lymph: no cervical adenopathy appreciated  Skin:  Normal. and no rash or abnormalities   Psych: alert and oriented to person, place, time and situation and Speech: appropriate quality, quantity and organization of sentences        Cognitive Screening   Evaluation of Cognitive Function:  Has your family/caregiver stated any concerns about your memory: no  Normal    Patient Care Team   Patient Care Team:  Jana Saavedra MD as PCP - General (Family Practice)  Berta Kline MD (Obstetrics & Gynecology)  Yobany Pace MD (Gastroenterology)  Alec Segal MD (Cardiology)  Armani Clark MD as Physician (Ophthalmology)  Calista Vieira MD (Dermatology)    Assessment/Plan   Education and counseling provided:  Are appropriate based on today's review and evaluation  End-of-Life planning (with patient's consent)  Pneumococcal Vaccine  Screening Mammography  Bone mass measurement (DEXA)  Screening for glaucoma    Diagnoses and all orders for this visit:    1. Medicare annual wellness visit, subsequent  -     [facility] denies any [facility].   -     [place] ANNUAL ALCOHOL SCREEN 15 MIN    2. Chronic coughing  -    Start  fluticasone propion-salmeterol (ADVAIR/WIXELA) 250-50 mcg/dose diskus inhaler; Take 1 Puff by inhalation every twelve (12) hours. -    Start  PULMONARY FUNCTION TEST; Future  -     montelukast (SINGULAIR) 10 mg tablet; Take 1 Tab by mouth daily. 3. Essential hypertension       - BP well controlled. 4. Screening for depression  -     [address]              Denies any depression. 5. Screening for alcoholism  -     [place] ANNUAL ALCOHOL SCREEN 15 MIN    Advised to obtain shingle vaccine form local pharmacy.      Health Maintenance Due   Topic Date Due    Shingrix Vaccine Age 49> (1 of 2) 01/29/2003    MEDICARE YEARLY EXAM  07/06/2019

## 2019-08-06 ENCOUNTER — HOSPITAL ENCOUNTER (OUTPATIENT)
Dept: PULMONOLOGY | Age: 64
Discharge: HOME | End: 2019-08-06
Attending: FAMILY MEDICINE
Payer: MEDICARE

## 2019-08-06 DIAGNOSIS — R05.3 CHRONIC COUGHING: ICD-10-CM

## 2019-08-06 PROCEDURE — 94010 BREATHING CAPACITY TEST: CPT

## 2019-08-12 NOTE — PROCEDURES
[address]   PULMONARY FUNCTION TEST    Name:  Patrice Power  MR#:  [id number]  :  1955  ACCOUNT #:  [de-identified]  DATE OF SERVICE:  2019      REQUESTING PHYSICIAN:  Jovani Carney MD    DIAGNOSIS:  Shortness of breath. ATS criteria for reproducibility and acceptability was met. SPIROMETRY:  FEV1/FVC ratio is 82 which is normal.  FEV1 is 3.02 L which is 118% predicted. FVC is 3.67 L which is 105% predicted. Flow volume loop is normal.    INTERPRETATION:  Normal spirometry. Clinical correlation advised.         Alexandre Martinez MD [email or web address]/  D:  2019 16:48  T:  2019 0:13  JOB #:  [id number]  CC:  Jovani Carney MD

## 2019-09-26 DIAGNOSIS — R05.3 CHRONIC COUGHING: ICD-10-CM

## 2019-09-26 RX ORDER — FLUTICASONE PROPIONATE AND SALMETEROL 50; 250 UG/1; UG/1
POWDER RESPIRATORY (INHALATION)
Qty: 1 INHALER | Refills: 1 | Status: SHIPPED | OUTPATIENT
Start: 2019-09-26 | End: 2020-06-22

## 2019-10-01 ENCOUNTER — PATIENT MESSAGE (OUTPATIENT)
Dept: CARDIOLOGY CLINIC | Age: 64
End: 2019-10-01

## 2019-10-01 DIAGNOSIS — M79.604 PAIN OF RIGHT LOWER EXTREMITY: Primary | ICD-10-CM

## 2020-02-03 ENCOUNTER — OFFICE VISIT (OUTPATIENT)
Dept: PRIMARY CARE CLINIC | Age: 65
End: 2020-02-03

## 2020-02-03 VITALS
OXYGEN SATURATION: 93 % | HEIGHT: 69 IN | DIASTOLIC BLOOD PRESSURE: 78 MMHG | SYSTOLIC BLOOD PRESSURE: 134 MMHG | HEART RATE: 94 BPM | RESPIRATION RATE: 17 BRPM | WEIGHT: 242.2 LBS | BODY MASS INDEX: 35.87 KG/M2 | TEMPERATURE: 98.2 F

## 2020-02-03 DIAGNOSIS — R05.9 COUGH: ICD-10-CM

## 2020-02-03 DIAGNOSIS — S05.11XA ECCHYMOSIS OF RIGHT EYE, INITIAL ENCOUNTER: ICD-10-CM
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
## 2020-02-03 DIAGNOSIS — R49.0 HOARSE VOICE QUALITY: ICD-10-CM

## 2020-02-03 DIAGNOSIS — J02.0 STREPTOCOCCAL PHARYNGITIS: Primary | ICD-10-CM

## 2020-02-03 DIAGNOSIS — R52 GENERALIZED BODY ACHES: ICD-10-CM

## 2020-02-03 LAB
FLUAV+FLUBV AG NOSE QL IA.RAPID: NEGATIVE POS/NEG
FLUAV+FLUBV AG NOSE QL IA.RAPID: NEGATIVE POS/NEG
S PYO AG THROAT QL: POSITIVE
VALID INTERNAL CONTROL?: YES
VALID INTERNAL CONTROL?: YES

## 2020-02-03 RX ORDER — BENZONATATE 200 MG/1
200 CAPSULE ORAL
Qty: 21 CAP | Refills: 0 | Status: SHIPPED | OUTPATIENT
Start: 2020-02-03 | End: 2020-02-10

## 2020-02-03 RX ORDER — AMOXICILLIN 875 MG/1
875 TABLET, FILM COATED ORAL 2 TIMES DAILY
Qty: 20 TAB | Refills: 0 | Status: SHIPPED | OUTPATIENT
Start: 2020-02-03 | End: 2020-02-13

## 2020-02-03 NOTE — PROGRESS NOTES
Subjective:     Chief Complaint   Patient presents with    Flu Like Symptoms     hoarsness, congestion, cough, generalized body aches, fever on saturday of 101.4, fatigue    Fall     fell down steps last thursday in a parking garage hit head, [facility] or anything done        She  is a 77y.o. year old female with  medical hx is significant for CAD, PVC, HTN, HLD, osteopenia is here with a c/o hoarseness of voice, fatigue, cough, body aches for the past two days. She reports that Friday symptoms started with mild coughing. Saturday and Sunday she did not feel well.  101.4 temp  fever on Saturday and nothing since then. She denies any fever but she feels something in her throat. Denies any chest pan, soa, chills. She is also here with a concern about a fall. States that she was coming out of garage and tripped and fell on the concrete and hit her right side of the face on 1/30/2020. She did not have any LOC, N/V, blurry vision, dizziness. [facility] where she was evaluated and asked for conservative management. No Xray was done. She reports that swelling has gone down and bruise cortez has been going down as well. Denies any pain over or around the bruised area. Mild pain in her right lower back otherwise she has been feeling fine. Pertinent items are noted in HPI. Objective:     Vitals:    02/03/20 1544 02/03/20 1621   BP: (!) 159/97 134/78   Pulse: 94    Resp: 17    Temp: 98.2 °F (36.8 °C)    TempSrc: Oral    SpO2: 93%    Weight: 242 lb 3.2 oz (109.9 kg)    Height: 5' 9\" (1.753 m)        Physical Examination: General appearance - alert, well appearing, and in no distress, oriented to person, place, and time and overweight  Mental status - alert, oriented to person, place, and time, normal mood, behavior, speech, dress, motor activity, and thought processes  Eyes - pupils equal and reactive, extraocular eye movements intact, non tender.  Ecchymosis noted in the right cheek below her right eye as well as small ecchymosis in right upper eyelid. There is an about quarter size swelling above her right eyebrow. No tenderness noted. Ears - bilateral TM's and external ear canals normal  Nose - normal and patent, no erythema, discharge or polyps  Mouth - Pharynx is very erythematous, irritated looking but no exudates . Neck - supple, no significant adenopathy  Chest - clear to auscultation, no wheezes, rales or rhonchi, symmetric air entry  Heart - normal rate, regular rhythm, normal S1, S2, no murmurs, rubs, clicks or gallops  Neurological - alert, oriented, normal speech, no focal findings or movement disorder noted      Allergies   Allergen Reactions    Sulfa (Sulfonamide Antibiotics) Rash     Generalized rash/hives    Boniva [Ibandronate] Other (comments)     Dysphagia      Carac [Fluorouracil] Cough and Other (comments)     Cogestion    Cozaar [Losartan] Shortness of Breath    Lisinopril Cough    Preservative Other (comments)     Preservative in eye drops make eyes red      Red Dye Rash      Social History     Socioeconomic History    Marital status:      Spouse name: Not on file    Number of children: Not on file    Years of education: Not on file    Highest education level: Not on file   Tobacco Use    Smoking status: Never Smoker    Smokeless tobacco: Never Used   Substance and Sexual Activity    Alcohol use:  Yes     Alcohol/week: 0.0 standard drinks     Comment: rarely    Drug use: No    Sexual activity: Yes     Partners: Male      Family History   Problem Relation Age of Onset    Cancer Father         lung (+tob)    Thyroid Disease Mother       Past Surgical History:   Procedure Laterality Date    ENDOSCOPY, COLON, DIAGNOSTIC  5/2003 , 2013 & 2018    (Brand)-due in 10 yrs for Colon/ 2018 upper Endos - nl    HX COLONOSCOPY  06/2018    Brand - repeat in 10 years    HX DILATION AND CURETTAGE  1990's   1 [place]    T&A    HX HEENT Left 2010 left eyelid surg. - lower lid reconstruction after MOHS for basal cell    HX MOHS PROCEDURES      L ankle, R posterior calf, and L lower eyelid    OK REVISION OF LOWER EYELID Bilateral 09/2019      Past Medical History:   Diagnosis Date    Calculus of kidney 04/2018    on CT scan - asymptomatic    Chronic venous insufficiency     fluid retention    DDD (degenerative disc disease)     GERD (gastroesophageal reflux disease)     H/O hiatal hernia     gastritis    History of actinic keratosis     basal cell - OS lower lid    Hx of squamous cell carcinoma     L lateral ankle and R posterior calf    Hypertension       Current Outpatient Medications   Medication Sig Dispense Refill    pravastatin (PRAVACHOL) 40 mg tablet Take 1 Tab by mouth nightly. 90 Tab 1    spironolactone (ALDACTONE) 25 mg tablet TAKE ONE TABLET BY MOUTH TWICE A  Tab 3    amLODIPine (NORVASC) 2.5 mg tablet TAKE TWO TABLETS BY MOUTH DAILY 180 Tab 6    potassium chloride SR (KLOR-CON 8) 8 mEq tablet Take 2 tablets daily as needed (when taking furosemide). 60 Tab 1    famotidine (PEPCID) 20 mg tablet Take 20 mg by mouth two (2) times a day.  LORazepam (ATIVAN) 0.5 mg tablet TAKE 1 TABLET BY MOUTH TWICE A DAY AS NEEDED FOR ANXIETY 60 Tab 3    OMEGA-3 FATTY ACIDS/FISH OIL (OMEGA 3 FISH OIL PO) Take  by mouth two (2) times a day. Indications: 1220 mg fish oil 360 mg omega-e      cholecalciferol (VITAMIN D3) 1,000 unit cap Take  by mouth daily.  multivitamin (ONE A DAY) tablet Take 1 Tab by mouth daily.  cranberry extract-vit C (AZO CRANBERRY PLUS VIT C) 250-60 mg cap Take 1 Tab by mouth daily. 30 Tab 0    aspirin 81 mg tablet Take 81 mg by mouth daily.  ADVAIR DISKUS 250-50 mcg/dose diskus inhaler INHALE ONE PUFF BY MOUTH TWICE A DAY (EVERY 12 HOURS) 1 Inhaler 1    montelukast (SINGULAIR) 10 mg tablet Take 1 Tab by mouth daily.  30 Tab 1    albuterol (PROVENTIL HFA, VENTOLIN HFA, PROAIR HFA) 90 mcg/actuation inhaler Take 2 Puffs by inhalation every six (6) hours as needed for Wheezing. 1 Inhaler 0    furosemide (LASIX) 20 mg tablet Take 1 Tab by mouth daily as needed. 30 Tab 8        Assessment/ Plan:   Diagnoses and all orders for this visit:    1. Streptococcal pharyngitis  -   Strep test is positive. Start   amoxicillin (AMOXIL) 875 mg tablet; Take 1 Tab by mouth two (2) times a day for 10 days. Salt water gurgle, OTC cough drop. Tylenol/advil for pain. 2. Hoarse voice quality  -     AMB POC RAPID STREP A is positive. -     amoxicillin (AMOXIL) 875 mg tablet; Take 1 Tab by mouth two (2) times a day for 10 days. 3. Generalized body aches  -     AMB POC LEON INFLUENZA A/B TEST is negative. Conservative management discussed. 4. Cough  -   Start benzonatate (TESSALON) 200 mg capsule; Take 1 Cap by mouth three (3) times daily as needed for Cough for up to 7 days. Patient had hx of prolong cough last year which took long time and different Rx to resolve the symptoms. Advised patient to start the Advair as a preventive measure. 5. Fall, initial encounter  -    Patient denies any pain or any neurological symptoms. Continue ice therapy. Advised to have Xray if any new symptoms. XR FACIAL BONES MIN 3 V; Future    6. Ecchymosis of right eye, initial encounter  -   Same as #5    XR FACIAL BONES MIN 3 V; Future           Medication risks/benefits/costs/interactions/alternatives discussed with patient. Advised patient to call back or return to office if symptoms worsen/change/persist. If patient cannot reach us or should anything more severe/urgent arise he/she should proceed directly to the nearest emergency department. Discussed expected course/resolution/complications of diagnosis in detail with patient. Patient given a written after visit summary which includes her diagnoses, current medications and vitals.   Patient expressed understanding with the diagnosis and plan.          Follow-up and Dispositions    · Return if symptoms worsen or fail to improve.

## 2020-02-03 NOTE — PROGRESS NOTES
Rich Jackson is a 77 y.o. female    Chief Complaint   Patient presents with    Flu Like Symptoms     hoarsness, congestion, cough, generalized body aches, fever on saturday of 101.4, fatigue    Fall     fell down steps last thursday in a parking garage hit head, [facility] or anything done       1. Have you been to the ER, urgent care clinic since your last visit? Hospitalized since your last visit? Yes See cc    2. Have you seen or consulted any other health care providers outside of the [address] [place] [address] since your last visit? Include any pap smears or colon screening. No    No flowsheet data found.      Health Maintenance Due   Topic Date Due    Shingrix Vaccine Age 49> (1 of 2) 01/29/2003    Bone Densitometry (Dexa) Screening  01/29/2018    GLAUCOMA SCREENING Q2Y  03/19/2020

## 2020-02-04 ENCOUNTER — PATIENT MESSAGE (OUTPATIENT)
Dept: PRIMARY CARE CLINIC | Age: 65
End: 2020-02-04

## 2020-02-04 DIAGNOSIS — J20.9 ACUTE BRONCHITIS, UNSPECIFIED ORGANISM: ICD-10-CM

## 2020-02-04 DIAGNOSIS — Z20.828 EXPOSURE TO THE FLU: Primary | ICD-10-CM

## 2020-02-04 RX ORDER — OSELTAMIVIR PHOSPHATE 75 MG/1
75 CAPSULE ORAL DAILY
Qty: 7 CAP | Refills: 0 | Status: SHIPPED | OUTPATIENT
Start: 2020-02-04 | End: 2020-02-11

## 2020-02-05 ENCOUNTER — OFFICE VISIT (OUTPATIENT)
Dept: PRIMARY CARE CLINIC | Age: 65
End: 2020-02-05

## 2020-02-05 ENCOUNTER — HOSPITAL ENCOUNTER (OUTPATIENT)
Dept: GENERAL RADIOLOGY | Age: 65
Discharge: HOME | End: 2020-02-05
Payer: MEDICARE

## 2020-02-05 VITALS
OXYGEN SATURATION: 99 % | RESPIRATION RATE: 18 BRPM | HEART RATE: 83 BPM | TEMPERATURE: 99 F | HEIGHT: 69 IN | SYSTOLIC BLOOD PRESSURE: 162 MMHG | DIASTOLIC BLOOD PRESSURE: 78 MMHG | BODY MASS INDEX: 35.77 KG/M2
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
## 2020-02-05 DIAGNOSIS — S05.11XA ECCHYMOSIS OF RIGHT EYE, INITIAL ENCOUNTER: ICD-10-CM

## 2020-02-05 DIAGNOSIS — S05.11XA ECCHYMOSIS OF RIGHT EYE, INITIAL ENCOUNTER: Primary | ICD-10-CM

## 2020-02-05 DIAGNOSIS — S00.11XD: ICD-10-CM

## 2020-02-05 DIAGNOSIS — I10 ESSENTIAL HYPERTENSION: ICD-10-CM

## 2020-02-05 PROBLEM — E66.01 SEVERE OBESITY (HCC): Status: ACTIVE | Noted: 2020-02-05

## 2020-02-05 PROCEDURE — 70150 X-RAY EXAM OF FACIAL BONES: CPT

## 2020-02-05 RX ORDER — ALBUTEROL SULFATE 90 UG/1
2 AEROSOL, METERED RESPIRATORY (INHALATION)
Qty: 1 INHALER | Refills: 0 | Status: SHIPPED | OUTPATIENT
Start: 2020-02-05 | End: 2021-07-02 | Stop reason: ALTCHOICE

## 2020-02-05 NOTE — PROGRESS NOTES
Subjective:     Chief Complaint   Patient presents with    Fall     follow up from fall - no other concerns        She  is a 77y.o. year old female who presents today for follow up from her recent fall. She was here on 2/3/2020 with bruise around her right eye as well as swelling right above the right eyebrow. Patient reports that she thinks the swelling is getting bigger but not sure. Denies any pain, N/V, HA, neck pain, eye pain, blurry vision. No concussion symptoms. Patient is currently being treated for strep and flu exposure. Pertinent items are noted in HPI. Objective:     Vitals:    02/05/20 1556 02/05/20 1636   BP: 167/80 162/78   Pulse: 83    Resp: 18    Temp: 99 °F (37.2 °C)    TempSrc: Oral    SpO2: 99%    Height: 5' 9\" (1.753 m)        Physical Examination: General appearance - alert, well appearing, and in no distress, oriented to person, place, and time and overweight  Mental status - alert, oriented to person, place, and time, normal mood, behavior, speech, dress, motor activity, and thought processes  Eyes - ecchymosis below the right eye and around. No change from last time. Non tender. Eye looks fine. Mouth - mucous membranes moist, pharynx erythematous without lesions. Neurological - alert, oriented, normal speech, no focal findings or movement disorder noted. There is about quarter sized soft swelling with ecchymosis over lateral side of the right eyebrow. Non tender.      Allergies   Allergen Reactions    Sulfa (Sulfonamide Antibiotics) Rash     Generalized rash/hives    Boniva [Ibandronate] Other (comments)     Dysphagia      Carac [Fluorouracil] Cough and Other (comments)     Cogestion    Cozaar [Losartan] Shortness of Breath    Lisinopril Cough    Preservative Other (comments)     Preservative in eye drops make eyes red      Red Dye Rash      Social History     Socioeconomic History    Marital status:      Spouse name: Not on file    Number of children: Not on file    Years of education: Not on file    Highest education level: Not on file   Tobacco Use    Smoking status: Never Smoker    Smokeless tobacco: Never Used   Substance and Sexual Activity    Alcohol use: Yes     Alcohol/week: 0.0 standard drinks     Comment: rarely    Drug use: No    Sexual activity: Yes     Partners: Male      Family History   Problem Relation Age of Onset    Cancer Father         lung (+tob)    Thyroid Disease Mother       Past Surgical History:   Procedure Laterality Date    ENDOSCOPY, COLON, DIAGNOSTIC  5/2003 , 2013 & 2018    (Brand)-due in 10 yrs for Colon/ 2018 upper Endos - nl    HX COLONOSCOPY  06/2018    Brand - repeat in 10 years    HX DILATION AND CURETTAGE  1990's    HX HEENT  1958    T&A    HX HEENT Left 2010    left eyelid surg. - lower lid reconstruction after MOHS for basal cell    HX MOHS PROCEDURES      L ankle, R posterior calf, and L lower eyelid    [place] REVISION OF LOWER EYELID Bilateral 09/2019      Past Medical History:   Diagnosis Date    Calculus of kidney 04/2018    on CT scan - asymptomatic    Chronic venous insufficiency     fluid retention    DDD (degenerative disc disease)     GERD (gastroesophageal reflux disease)     H/O hiatal hernia     gastritis    History of actinic keratosis     basal cell - OS lower lid    Hx of squamous cell carcinoma     L lateral ankle and R posterior calf    Hypertension       Current Outpatient Medications   Medication Sig Dispense Refill    albuterol (PROVENTIL HFA, VENTOLIN HFA, PROAIR HFA) 90 mcg/actuation inhaler Take 2 Puffs by inhalation every six (6) hours as needed for Wheezing. 1 Inhaler 0    oseltamivir (TAMIFLU) 75 mg capsule Take 1 Cap by mouth daily for 7 days. 7 Cap 0    amoxicillin (AMOXIL) 875 mg tablet Take 1 Tab by mouth two (2) times a day for 10 days. 20 Tab 0    benzonatate (TESSALON) 200 mg capsule Take 1 Cap by mouth three (3) times daily as needed for Cough for up to 7 days.  21 Cap 0  pravastatin (PRAVACHOL) 40 mg tablet Take 1 Tab by mouth nightly. 90 Tab 1    ADVAIR DISKUS 250-50 mcg/dose diskus inhaler INHALE ONE PUFF BY MOUTH TWICE A DAY (EVERY 12 HOURS) 1 Inhaler 1    montelukast (SINGULAIR) 10 mg tablet Take 1 Tab by mouth daily. 30 Tab 1    spironolactone (ALDACTONE) 25 mg tablet TAKE ONE TABLET BY MOUTH TWICE A  Tab 3    amLODIPine (NORVASC) 2.5 mg tablet TAKE TWO TABLETS BY MOUTH DAILY 180 Tab 6    potassium chloride SR (KLOR-CON 8) 8 mEq tablet Take 2 tablets daily as needed (when taking furosemide). 60 Tab 1    furosemide (LASIX) 20 mg tablet Take 1 Tab by mouth daily as needed. 30 Tab 8    famotidine (PEPCID) 20 mg tablet Take 20 mg by mouth two (2) times a day.  LORazepam (ATIVAN) 0.5 mg tablet TAKE 1 TABLET BY MOUTH TWICE A DAY AS NEEDED FOR ANXIETY 60 Tab 3    OMEGA-3 FATTY ACIDS/FISH OIL (OMEGA 3 FISH OIL PO) Take  by mouth two (2) times a day. Indications: 1220 mg fish oil 360 mg omega-e      cholecalciferol (VITAMIN D3) 1,000 unit cap Take  by mouth daily.  multivitamin (ONE A DAY) tablet Take 1 Tab by mouth daily.  cranberry extract-vit C (AZO CRANBERRY PLUS VIT C) 250-60 mg cap Take 1 Tab by mouth daily. 30 Tab 0    aspirin 81 mg tablet Take 81 mg by mouth daily. Assessment/ Plan:   Diagnoses and all orders for this visit:    1. Ecchymosis of right eye, initial encounter      Patient had her Xray right before she came. Xray is clean. No fracture noted. No signs of infection, no signs of concussion. 2. Traumatic hematoma of right eyebrow, subsequent encounter      Discussed about warning symptoms of infection and plan of action. Ice therapy. 3. Essential hypertension      BP is elevated in office. Continue Monitor BP and follow up IF BP stays high. Medication risks/benefits/costs/interactions/alternatives discussed with patient.   Advised patient to call back or return to office if symptoms worsen/change/persist. If patient cannot reach us or should anything more severe/urgent arise he/she should proceed directly to the nearest emergency department. Discussed expected course/resolution/complications of diagnosis in detail with patient. Patient given a written after visit summary which includes her diagnoses, current medications and vitals. Patient expressed understanding with the diagnosis and plan. Follow-up and Dispositions    · Return in about 3 months (around 5/5/2020), or if symptoms worsen or fail to improve, for Bring BP log book. Dilia Vickers

## 2020-02-05 NOTE — TELEPHONE ENCOUNTER
Royal Olivia LPN 8/5/5840 0:16 AM EST      ----- Message -----  From: Natividad Rachel  Sent: 2/4/2020 9:52 PM EST  To: [facility]  Subject: Prescription Question     Hi Dr Shanika Elam,  I know you said to start using my inhaler and when I pulled the prescription box out of the cabinet. ... there was nothing in it. So can you send a prescription to [address] [place] [address]? Can you call it be for a generic brand or one that isn't so expensive?   Leida Chen

## 2020-02-05 NOTE — PROGRESS NOTES
Chief Complaint   Patient presents with    Fall     follow up from fall - no other concerns       1. Have you been to the ER, urgent care clinic since your last visit? Hospitalized since your last visit? No    2. Have you seen or consulted any other health care providers outside of the [address] [place] [address] since your last visit? Include any pap smears or colon screening.  No

## 2020-06-18 RX ORDER — PRAVASTATIN SODIUM 40 MG/1
40 TABLET ORAL
Qty: 90 TAB | Refills: 1 | Status: SHIPPED | OUTPATIENT
Start: 2020-06-18 | End: 2021-01-23 | Stop reason: SDUPTHER

## 2020-06-18 NOTE — TELEPHONE ENCOUNTER
Requested Prescriptions     Signed Prescriptions Disp Refills    pravastatin (PRAVACHOL) 40 mg tablet 90 Tab 1     Sig: Take 1 Tab by mouth nightly.      Authorizing Provider: Sonya Chong     Ordering User: Skylar Minor     Per verbal orders

## 2020-06-22 ENCOUNTER — VIRTUAL VISIT (OUTPATIENT)
Dept: CARDIOLOGY CLINIC | Age: 65
End: 2020-06-22

## 2020-06-22 DIAGNOSIS — I10 ESSENTIAL HYPERTENSION: ICD-10-CM

## 2020-06-22 DIAGNOSIS — E78.5 HYPERLIPIDEMIA, UNSPECIFIED HYPERLIPIDEMIA TYPE: ICD-10-CM

## 2020-06-22 DIAGNOSIS — E55.9 VITAMIN D DEFICIENCY: ICD-10-CM

## 2020-06-22 DIAGNOSIS — I49.3 PVC (PREMATURE VENTRICULAR CONTRACTION): ICD-10-CM

## 2020-06-22 DIAGNOSIS — E87.6 HYPOKALEMIA: ICD-10-CM

## 2020-06-22 DIAGNOSIS — I25.10 ATHEROSCLEROSIS OF NATIVE CORONARY ARTERY OF NATIVE HEART WITHOUT ANGINA PECTORIS: Primary | ICD-10-CM

## 2020-06-22 RX ORDER — NIACINAMIDE 500 MG
500 TABLET ORAL 2 TIMES DAILY WITH MEALS
COMMUNITY
End: 2021-06-24 | Stop reason: SDUPTHER

## 2020-06-22 NOTE — Clinical Note
Needs labs mailed out, cbc cmp lipids vit d and mag, quest and lab beau she's is not sure which one Also change her amlodipine to 2.5x2 in the morning and one in the evening.

## 2020-06-22 NOTE — PROGRESS NOTES
VIRTUAL VISIT DOCUMENTATION     Pursuant to the emergency declaration under the [facility], [facility] waiver authority and the [employer] Act, this Virtual  Visit was conducted, with patient's consent, to reduce the patient's risk of exposure to COVID-19 and provide continuity of care for an established patient. Services were provided through a video synchronous discussion virtually to substitute for in-person clinic visit. CHIEF COMPLAINT      Katrin Pérez is a 77 y.o. female who was seen by synchronous (real-time) audio-video technology on 6/22/2020. Patient is being seen today for CAD, dyspnea. She has gone to a sedentary job sitting all day not moving. BP is up and she is not happy with that. She feels great except for her knee and back at times. The breathing trouble comes and goes with her allergies. No palpitations in the last few month. Some swelling is she stands all day in the left ankle but not bad. Does wear compression stockings if standing all day or out in the heat. **After her last office visit records received from [facility] dated 5/10/21   presents for evaluation of polycythemia. CBC in January showed a hemoglobin of 16.9, hematocrit of 48.1 and RBC of 5.16 with normal platelets and WBC. Labs from April 15 showed hemoglobin of 17, hematocrit 47% but otherwise normal  We will go ahead and get an erythropoietin level, if this is elevated just of of secondary polycythemia and would do a sleep study to evaluate. She has risk factors for cardiovascular disease and differential includes renal artery stenosis. We will do a urinalysis to look for blood in her urine. Primary polycythemia vera is the other differential which is a bone marrow disorder that increases the risk for thrombosis.   We will do JAK2 with reflex as well as BCR ABL testing may need a bone marrow biopsy     ASSESSMENT      Doing fine on the spironolactone but BP creeping up today. Weight up to 242 today also. Talked through exercise, standing desk etc but no easy solution for her right now, working three jobs, etc.     At this point, no chest pain she feels that she is just not active like she wants to be. Will defer stress testing for a while longer. Increase amlodipine to 5/2.5 am/pm and get labs on her. A/P:  1. CAD- calcium score of 250 2011, continue aspirin and pravastatin  -consider stress testing in 2020 for progression. 2. Hyperlipidemia- LDL near goal, LDL 85  Cont statin  3. Overweight- Body mass index is 33.2 kg/m². encouraged her to exercise  4. PSVT- palpitations well controlled now, could not tolerate Bystolic, previously discussed suppression medications and anti-anxiety medications, not taking Verapamil SR (had been on it in her 30s for SVT at 220bpm but then stopped by Dr. Mei Colunga years later), now doing ok with the change in diuretic so potassium seems to be a culprit  -palpitations worsened by dyazide but for a while doing ok on dyazide/spironolactone/KCL combination, now palps good on spironolactone alone  -PVC were very back in the past but doing ok. 5. HTN- at goal now on amlodipine today and spironolactone  6. Elevated AST/ALT- resolved   7. Insulin Resistance-  continue diet and exercise   8. Vit D deficiency- on 1000 units daily   9. Hypokalemia - stable now, on supplements  10. Edema - well controlled now on spironolactone and dyazide  11. GERD - on Prilosec, followed by GI/Dr. Kumar Child    Stress Echo 7/17 Exe 8:00  no WMA  Stress echo 7/15 exe 9:00, EF 60%, no WMA  Echo 7/15 EF 65%, normal  Stress nuc 2009 nl EF 67%  Ca Score 2011 250    Fhx +CAD    We discussed the expected course, resolution and complications of the diagnosis(es) in detail. Medication risks, benefits, costs, interactions, and alternatives were discussed as indicated.   I advised her to contact the office if her condition worsens, changes or fails to improve as anticipated.  She expressed understanding with the diagnosis(es) and plan    HISTORY OF PRESENTING ILLNESS      Deborah Cohen is a 77 y.o. female        ACTIVE PROBLEM LIST     Patient Active Problem List    Diagnosis Date Noted    [facility]    Hyperlipidemia LDL goal <70 07/17/2019    Bladder retention of urine 08/14/2018    PVC (premature ventricular contraction) 07/12/2017    Postmenopausal atrophic vaginitis 06/09/2014    Osteopenia 10/17/2012    Hypokalemia 04/25/2012    Coronary atherosclerosis of native coronary artery 02/14/2012    Hyperlipemia 02/14/2012    PSVT (paroxysmal supraventricular tachycardia) ([facility] 02/14/2012    HTN (hypertension) 02/14/2012    Overweight (BMI 25.0-29.9) 02/14/2012           PAST MEDICAL HISTORY     Past Medical History:   Diagnosis Date    Calculus of kidney 04/2018    on CT scan - asymptomatic    Chronic venous insufficiency     fluid retention    DDD (degenerative disc disease)     GERD (gastroesophageal reflux disease)     H/O hiatal hernia     gastritis    History of actinic keratosis     basal cell - OS lower lid    Hx of squamous cell carcinoma     L lateral ankle and R posterior calf    Hypertension            PAST SURGICAL HISTORY     Past Surgical History:   Procedure Laterality Date    ENDOSCOPY, COLON, DIAGNOSTIC  5/2003 , 2013 & 2018    (Brand)-due in 10 yrs for Colon/ 2018 upper Endos - nl    HX COLONOSCOPY  06/2018    Brand - repeat in 10 years    HX DILATION AND CURETTAGE  1990's    HX HEENT  1958    T&A    HX HEENT Left 2010    left eyelid surg. - lower lid reconstruction after MOHS for basal cell    HX MOHS PROCEDURES      L ankle, R posterior calf, and L lower eyelid    MA REVISION OF LOWER EYELID Bilateral 09/2019          ALLERGIES     Allergies   Allergen Reactions    Sulfa (Sulfonamide Antibiotics) Rash     Generalized rash/hives    Boniva [Ibandronate] Other (comments) Dysphagia      Carac [Fluorouracil] Cough and Other (comments)     Cogestion    Cozaar [Losartan] Shortness of Breath    Lisinopril Cough    Preservative Other (comments)     Preservative in eye drops make eyes red      Red Dye Rash          FAMILY HISTORY     Family History   Problem Relation Age of Onset    Cancer Father         lung (+tob)    Thyroid Disease Mother     negative for cardiac disease       SOCIAL HISTORY     Social History     Socioeconomic History    Marital status:      Spouse name: Not on file    Number of children: Not on file    Years of education: Not on file    Highest education level: Not on file   Tobacco Use    Smoking status: Never Smoker    Smokeless tobacco: Never Used   Substance and Sexual Activity    Alcohol use: Yes     Alcohol/week: 0.0 standard drinks     Comment: rarely    Drug use: No    Sexual activity: Yes     Partners: Male         MEDICATIONS     Current Outpatient Medications   Medication Sig    amLODIPine (NORVASC) 2.5 mg tablet Please take 2 tabs in the am and 1 tab in the pm    niacinamide 500 mg tablet Take 500 mg by mouth two (2) times daily (with meals).  pravastatin (PRAVACHOL) 40 mg tablet Take 1 Tab by mouth nightly.  albuterol (PROVENTIL HFA, VENTOLIN HFA, PROAIR HFA) 90 mcg/actuation inhaler Take 2 Puffs by inhalation every six (6) hours as needed for Wheezing.  famotidine (Pepcid) 20 mg tablet Take 20 mg by mouth two (2) times a day.  OMEGA-3 FATTY ACIDS/FISH OIL (OMEGA 3 FISH OIL PO) Take  by mouth two (2) times a day. Indications: 1220 mg fish oil 360 mg omega-e    cholecalciferol (VITAMIN D3) 1,000 unit cap Take  by mouth daily.  multivitamin (ONE A DAY) tablet Take 1 Tab by mouth daily.  cranberry extract-vit C (AZO CRANBERRY PLUS VIT C) 250-60 mg cap Take 1 Tab by mouth daily.  aspirin 81 mg tablet Take 81 mg by mouth daily.  spironolactone (ALDACTONE) 25 mg tablet Take 1 Tab by mouth two (2) times a day.  potassium chloride SR (KLOR-CON 8) 8 mEq tablet Take 2 tablets daily as needed (when taking furosemide).  furosemide (LASIX) 20 mg tablet Take 1 Tab by mouth daily as needed.  LORazepam (ATIVAN) 0.5 mg tablet TAKE 1 TABLET BY MOUTH TWICE A DAY AS NEEDED FOR ANXIETY     No current facility-administered medications for this visit. I have reviewed the nurses notes, vitals, problem list, allergy list, medical history, family, social history and medications. REVIEW OF SYMPTOMS     Constitutional: Negative for fever, chills, malaise/fatigue and diaphoresis. Respiratory: Negative for cough, hemoptysis, sputum production, shortness of breath and wheezing. Cardiovascular: Negative for chest pain, palpitations, orthopnea, claudication, leg swelling and PND. Gastrointestinal: Negative for heartburn, nausea, vomiting, blood in stool and melena. Genitourinary: Negative for dysuria and flank pain. Musculoskeletal: Negative for joint pain and back pain. Skin: Negative for rash. Neurological: Negative for focal weakness, seizures, loss of consciousness, weakness and headaches. Endo/Heme/Allergies: Negative for abnormal bleeding. Psychiatric/Behavioral: Negative for memory loss. PHYSICAL EXAMINATION      Due to this being a TeleHealth evaluation, many elements of the physical examination are unable to be assessed. General: Well developed, in no acute distress, cooperative and alert  HEENT: Pupils equal/round. No marked JVD visible on video. Respiratory: No audible wheezing, no signs of respiratory distress, lips non cyanotic  Extremities:  No edema  Neuro: A&Ox3, speech clear, no facial droop, answering questions appropriately  Skin: Skin color is normal. No rashes or lesions. Non diaphoretic on visible skin during exam       DIAGNOSTIC DATA      No specialty comments available.        LABORATORY DATA      Lab Results   Component Value Date/Time    WBC 8.1 07/16/2019 11:35 AM    HGB 15.2 07/16/2019 11:35 AM    HCT 43.7 07/16/2019 11:35 AM    PLATELET 039 06/19/4631 11:35 AM    MCV 92.0 07/16/2019 11:35 AM      Lab Results   Component Value Date/Time    Sodium 138 07/16/2019 11:35 AM    Potassium 4.2 07/16/2019 11:35 AM    Chloride 101 07/16/2019 11:35 AM    CO2 27 07/16/2019 11:35 AM    Glucose 93 07/16/2019 11:35 AM    BUN 14 07/16/2019 11:35 AM    Creatinine 0.71 07/16/2019 11:35 AM    BUN/Creatinine ratio NOT APPLICABLE 02/57/4925 83:32 AM    GFR est  07/16/2019 11:35 AM    GFR est non-AA 89 07/16/2019 11:35 AM    Calcium 9.7 07/16/2019 11:35 AM    Bilirubin, total 1.0 07/16/2019 11:35 AM    Alk. phosphatase 72 07/16/2019 11:35 AM    Protein, total 7.0 07/16/2019 11:35 AM    Albumin 4.2 07/16/2019 11:35 AM    Globulin 2.8 07/16/2019 11:35 AM    A-G Ratio 1.6 02/11/2016 10:27 AM    ALT (SGPT) 22 07/16/2019 11:35 AM             FOLLOW-UP     Follow-up and Dispositions              Patient was made aware and verbalized understanding that an appointment will be scheduled for them for a virtual visit and/or office visit within the above time frame. Patient understanding his/her responsibility to call and change time/date if he/she so chooses. Thank you, Agnieszka Khan MD for allowing me to participate in the care of Shazia Piedra. Please do not hesitate to contact me for further questions/concerns. Greater than 20 minutes was spent in direct video patient care, planning and chart review. This visit was conducted using [employer] telemedicine services.        Adeel Wiley MD    [address]        [phone] / [phone] Fax       [facility] [facility] [address]  Madeline Scott  [phone] / [phone] Fax

## 2020-06-23 RX ORDER — AMLODIPINE BESYLATE 2.5 MG/1
TABLET ORAL
Qty: 270 TAB | Refills: 3 | Status: SHIPPED | OUTPATIENT
Start: 2020-06-23 | End: 2021-06-24 | Stop reason: SDUPTHER

## 2020-06-23 NOTE — PATIENT INSTRUCTIONS
Venora Phalen, MD Thor Igo, RN  
  
   
  
Needs labs mailed out, cbc cmp lipids vit d and mag, quest and lab beau she's is not sure which one Also change her amlodipine to 2.5x2 in the morning and one in the evening. 2 sets of lab slips mailed to patient. Requested Prescriptions Signed Prescriptions Disp Refills  amLODIPine (NORVASC) 2.5 mg tablet 270 Tab 3   Sig: Please take 2 tabs in the am and 1 tab in the pm

## 2020-07-01 RX ORDER — SPIRONOLACTONE 25 MG/1
25 TABLET ORAL 2 TIMES DAILY
Qty: 180 TAB | Refills: 3 | Status: SHIPPED | OUTPATIENT
Start: 2020-07-01 | End: 2021-06-24 | Stop reason: SDUPTHER

## 2020-07-01 NOTE — TELEPHONE ENCOUNTER
Requested Prescriptions     Signed Prescriptions Disp Refills    spironolactone (ALDACTONE) 25 mg tablet 180 Tab 3     Sig: Take 1 Tab by mouth two (2) times a day.      Authorizing Provider: Estefany Marley     Ordering User: Adama Santoyo     Per verbal orders

## 2021-01-23 LAB
25(OH)D3 SERPL-MCNC: 36 NG/ML (ref 30–100)
ALB/GLOBRATIO, 58C: 1.8 (CALC) (ref 1–2.5)
ALBUMIN SERPL-MCNC: 4.6 G/DL (ref 3.6–5.1)
ALKALINE PHOSPHATASE, TOTAL, 25002000: 77 U/L (ref 37–153)
ALT SERPL-CCNC: 33 U/L (ref 6–29)
AST SERPL W P-5'-P-CCNC: 31 U/L (ref 10–35)
BILIRUB SERPL-MCNC: 1.1 MG/DL (ref 0.2–1.2)
BUN SERPL-MCNC: 17 MG/DL (ref 7–25)
BUN/CREATININE RATIO,BUCR: ABNORMAL (CALC) (ref 6–22)
CALCIUM SERPL-MCNC: 10.3 MG/DL (ref 8.6–10.4)
CHLORIDE SERPL-SCNC: 102 MMOL/L (ref 98–110)
CHOL/HDL RATIO,CHHDX: 2.5 (CALC)
CHOLEST SERPL-MCNC: 160 MG/DL
CO2 SERPL-SCNC: 28 MMOL/L (ref 20–32)
CREAT SERPL-MCNC: 0.76 MG/DL (ref 0.5–0.99)
ERYTHROCYTE [DISTWIDTH] IN BLOOD BY AUTOMATED COUNT: 12.6 % (ref 11–15)
GLOBULIN,GLOB: 2.6 G/DL (CALC) (ref 1.9–3.7)
GLUCOSE SERPL-MCNC: 113 MG/DL (ref 65–99)
HCT VFR BLD AUTO: 48.1 % (ref 35–45)
HDLC SERPL-MCNC: 65 MG/DL
HGB BLD-MCNC: 16.9 G/DL (ref 11.7–15.5)
LDL-CHOLESTEROL: 76 MG/DL (CALC)
MAGNESIUM SERPL-MCNC: 2.3 MG/DL (ref 1.5–2.5)
MCH RBC QN AUTO: 32.8 PG (ref 27–33)
MCHC RBC AUTO-ENTMCNC: 35.1 G/DL (ref 32–36)
MCV RBC AUTO: 93.2 FL (ref 80–100)
NON-HDL CHOLESTEROL, 011976: 95 MG/DL (CALC)
PLATELET # BLD AUTO: 255 THOUSAND/UL (ref 140–400)
PMV BLD AUTO: 12 FL (ref 7.5–12.5)
POTASSIUM SERPL-SCNC: 4.6 MMOL/L (ref 3.5–5.3)
PROT SERPL-MCNC: 7.2 G/DL (ref 6.1–8.1)
RBC # BLD AUTO: 5.16 MILLION/UL (ref 3.8–5.1)
SODIUM SERPL-SCNC: 141 MMOL/L (ref 135–146)
TRIGL SERPL-MCNC: 102 MG/DL (ref ?–150)
WBC # BLD AUTO: 9.2 THOUSAND/UL (ref 3.8–10.8)

## 2021-01-25 RX ORDER — PRAVASTATIN SODIUM 40 MG/1
40 TABLET ORAL
Qty: 90 TAB | Refills: 1 | Status: SHIPPED | OUTPATIENT
Start: 2021-01-25 | End: 2021-06-24 | Stop reason: SDUPTHER

## 2021-01-25 NOTE — TELEPHONE ENCOUNTER
Requested Prescriptions     Signed Prescriptions Disp Refills    pravastatin (PRAVACHOL) 40 mg tablet 90 Tab 1     Sig: Take 1 Tab by mouth nightly.      Authorizing Provider: Nanette Bhat     Ordering User: Sunita Salazar     Per verbal orders

## 2021-02-25 DIAGNOSIS — I10 ESSENTIAL HYPERTENSION: ICD-10-CM

## 2021-02-25 DIAGNOSIS — I25.10 ATHEROSCLEROSIS OF NATIVE CORONARY ARTERY OF NATIVE HEART WITHOUT ANGINA PECTORIS: ICD-10-CM

## 2021-03-25 LAB
BUN SERPL-MCNC: 14 MG/DL (ref 7–25)
BUN/CREATININE RATIO,BUCR: ABNORMAL (CALC) (ref 6–22)
CALCIUM SERPL-MCNC: 10.1 MG/DL (ref 8.6–10.4)
CHLORIDE SERPL-SCNC: 104 MMOL/L (ref 98–110)
CO2 SERPL-SCNC: 22 MMOL/L (ref 20–32)
CREAT SERPL-MCNC: 0.82 MG/DL (ref 0.5–0.99)
ERYTHROCYTE [DISTWIDTH] IN BLOOD BY AUTOMATED COUNT: 12.4 % (ref 11–15)
GLUCOSE SERPL-MCNC: 109 MG/DL (ref 65–99)
HCT VFR BLD AUTO: 48.5 % (ref 35–45)
HGB BLD-MCNC: 16.4 G/DL (ref 11.7–15.5)
MCH RBC QN AUTO: 32.1 PG (ref 27–33)
MCHC RBC AUTO-ENTMCNC: 33.8 G/DL (ref 32–36)
MCV RBC AUTO: 94.9 FL (ref 80–100)
PLATELET # BLD AUTO: 266 THOUSAND/UL (ref 140–400)
PMV BLD AUTO: 12.3 FL (ref 7.5–12.5)
POTASSIUM SERPL-SCNC: 4.7 MMOL/L (ref 3.5–5.3)
RBC # BLD AUTO: 5.11 MILLION/UL (ref 3.8–5.1)
SODIUM SERPL-SCNC: 143 MMOL/L (ref 135–146)
WBC # BLD AUTO: 9.5 THOUSAND/UL (ref 3.8–10.8)

## 2021-06-24 ENCOUNTER — OFFICE VISIT (OUTPATIENT)
Dept: CARDIOLOGY CLINIC | Age: 66
End: 2021-06-24
Payer: MEDICARE

## 2021-06-24 VITALS
HEIGHT: 69 IN | BODY MASS INDEX: 33.62 KG/M2 | WEIGHT: 227 LBS | OXYGEN SATURATION: 98 % | SYSTOLIC BLOOD PRESSURE: 150 MMHG | HEART RATE: 85 BPM | DIASTOLIC BLOOD PRESSURE: 82 MMHG | RESPIRATION RATE: 18 BRPM

## 2021-06-24 DIAGNOSIS — I10 ESSENTIAL HYPERTENSION: ICD-10-CM

## 2021-06-24 DIAGNOSIS — I49.3 PVC (PREMATURE VENTRICULAR CONTRACTION): ICD-10-CM

## 2021-06-24 DIAGNOSIS — E87.6 HYPOKALEMIA: ICD-10-CM

## 2021-06-24 DIAGNOSIS — I47.1 PSVT (PAROXYSMAL SUPRAVENTRICULAR TACHYCARDIA) (HCC): ICD-10-CM

## 2021-06-24 DIAGNOSIS — E78.5 HYPERLIPIDEMIA, UNSPECIFIED HYPERLIPIDEMIA TYPE: ICD-10-CM

## 2021-06-24 DIAGNOSIS — I25.10 ATHEROSCLEROSIS OF NATIVE CORONARY ARTERY OF NATIVE HEART WITHOUT ANGINA PECTORIS: Primary | ICD-10-CM

## 2021-06-24 PROCEDURE — G0463 HOSPITAL OUTPT CLINIC VISIT: HCPCS | Performed by: INTERNAL MEDICINE

## 2021-06-24 PROCEDURE — G8427 DOCREV CUR MEDS BY ELIG CLIN: HCPCS | Performed by: INTERNAL MEDICINE

## 2021-06-24 PROCEDURE — 99214 OFFICE O/P EST MOD 30 MIN: CPT | Performed by: INTERNAL MEDICINE

## 2021-06-24 PROCEDURE — G8754 DIAS BP LESS 90: HCPCS | Performed by: INTERNAL MEDICINE

## 2021-06-24 PROCEDURE — 1101F PT FALLS ASSESS-DOCD LE1/YR: CPT | Performed by: INTERNAL MEDICINE

## 2021-06-24 PROCEDURE — 1090F PRES/ABSN URINE INCON ASSESS: CPT | Performed by: INTERNAL MEDICINE

## 2021-06-24 PROCEDURE — G8753 SYS BP > OR = 140: HCPCS | Performed by: INTERNAL MEDICINE

## 2021-06-24 PROCEDURE — G8536 NO DOC ELDER MAL SCRN: HCPCS | Performed by: INTERNAL MEDICINE

## 2021-06-24 PROCEDURE — 3017F COLORECTAL CA SCREEN DOC REV: CPT | Performed by: INTERNAL MEDICINE

## 2021-06-24 PROCEDURE — G8417 CALC BMI ABV UP PARAM F/U: HCPCS | Performed by: INTERNAL MEDICINE

## 2021-06-24 PROCEDURE — G8400 PT W/DXA NO RESULTS DOC: HCPCS | Performed by: INTERNAL MEDICINE

## 2021-06-24 PROCEDURE — 93010 ELECTROCARDIOGRAM REPORT: CPT | Performed by: INTERNAL MEDICINE

## 2021-06-24 PROCEDURE — G8510 SCR DEP NEG, NO PLAN REQD: HCPCS | Performed by: INTERNAL MEDICINE

## 2021-06-24 PROCEDURE — G9899 SCRN MAM PERF RSLTS DOC: HCPCS | Performed by: INTERNAL MEDICINE

## 2021-06-24 PROCEDURE — 93005 ELECTROCARDIOGRAM TRACING: CPT | Performed by: INTERNAL MEDICINE

## 2021-06-24 RX ORDER — SPIRONOLACTONE 25 MG/1
25 TABLET ORAL 2 TIMES DAILY
Qty: 180 TABLET | Refills: 3 | Status: SHIPPED | OUTPATIENT
Start: 2021-06-24

## 2021-06-24 RX ORDER — CLINDAMYCIN PHOSPHATE 10 UG/ML
LOTION TOPICAL
COMMUNITY
Start: 2021-04-22

## 2021-06-24 RX ORDER — FUROSEMIDE 20 MG/1
20 TABLET ORAL DAILY
Qty: 30 TABLET | Refills: 8 | Status: SHIPPED | OUTPATIENT
Start: 2021-06-24

## 2021-06-24 RX ORDER — POTASSIUM CHLORIDE 600 MG/1
TABLET, FILM COATED, EXTENDED RELEASE ORAL
Qty: 60 TABLET | Refills: 1 | Status: SHIPPED | OUTPATIENT
Start: 2021-06-24

## 2021-06-24 RX ORDER — AMLODIPINE BESYLATE 2.5 MG/1
TABLET ORAL
Qty: 270 TABLET | Refills: 3 | Status: SHIPPED | OUTPATIENT
Start: 2021-06-24

## 2021-06-24 RX ORDER — NIACINAMIDE 500 MG
500 TABLET ORAL 2 TIMES DAILY WITH MEALS
Qty: 90 TABLET | Refills: 3 | Status: SHIPPED
Start: 2021-06-24 | End: 2021-06-24 | Stop reason: ALTCHOICE

## 2021-06-24 RX ORDER — PRAVASTATIN SODIUM 40 MG/1
40 TABLET ORAL
Qty: 90 TABLET | Refills: 1 | Status: SHIPPED | OUTPATIENT
Start: 2021-06-24

## 2021-06-24 NOTE — PROGRESS NOTES
CHIEF COMPLAINT      Vani Lezama is a 74 y.o. female who was seen  today for CAD, dyspnea. Had polycythemia, seeing heme has a VV next month and then going to have sleep study. Breathing is ok now. Continues to have that feeling like she cant get a deep breath and for a while it was bad, very out of breath walking to the elevator but had gained to 242, now down 19.2 pounds and thinks that is helping her. Glucose 85 k 5.2     Needs stress testing for her   Got amniotic fluid injection in her knee which helped a little bit for a while  Will get lexiscan nuc to look at her CAD for progression. Given that she has the ongoing feeling of not being able to take a deep breath and has not been exercising as much as she like to the knee and has underlying CAD which we have not tested in several years. Cholesterol looked good   She has gone to a sedentary job sitting all day not moving. BP is up and she is not happy with that. She feels great except for her knee and back at times. The breathing trouble comes and goes with her allergies. No palpitations in the last few month. Some swelling is she stands all day in the left ankle but not bad. Does wear compression stockings if standing all day or out in the heat. **After her last office visit records received from Dominik Garcia dated 5/10/21   presents for evaluation of polycythemia. CBC in January showed a hemoglobin of 16.9, hematocrit of 48.1 and RBC of 5.16 with normal platelets and WBC. Labs from April 15 showed hemoglobin of 17, hematocrit 47% but otherwise normal  We will go ahead and get an erythropoietin level, if this is elevated just of of secondary polycythemia and would do a sleep study to evaluate. She has risk factors for cardiovascular disease and differential includes renal artery stenosis. We will do a urinalysis to look for blood in her urine.   Primary polycythemia vera is the other differential which is a bone marrow disorder that increases the risk for thrombosis. We will do JAK2 with reflex as well as BCR ABL testing may need a bone marrow biopsy     ASSESSMENT      Doing fine on the spironolactone but BP creeping up today. Weight is actually down to 227 most she has excellent progress for her  Talked through exercise, standing desk etc but no easy solution for her right now, working three jobs, etc.     Her amlodipine at 5/2.5 seems to be working okay her blood pressure is a little bit high today but she is anxious about this visit. Normally it is better    A/P:  1. CAD- calcium score of 250 2011, continue aspirin and pravastatin  -consider stress testing in 2020 for progression. 2. Hyperlipidemia- LDL near goal, LDL 85  Cont statin  3. Overweight- Body mass index is 33.2 kg/m². encouraged her to exercise  4. PSVT- palpitations well controlled now, could not tolerate Bystolic, previously discussed suppression medications and anti-anxiety medications, not taking Verapamil SR (had been on it in her 30s for SVT at 220bpm but then stopped by Dr. Allie Mendiola years later), now doing ok with the change in diuretic so potassium seems to be a culprit  -palpitations worsened by dyazide but for a while doing ok on dyazide/spironolactone/KCL combination, now palps good on spironolactone alone  -PVC were very back in the past but doing ok. 5. HTN- at goal now on amlodipine today and spironolactone  6. Elevated AST/ALT- resolved   7. Insulin Resistance-  continue diet and exercise   8. Vit D deficiency- on 1000 units daily   9. Hypokalemia - stable now, on supplements  10. Edema - well controlled now on spironolactone and dyazide  11. GERD - on Prilosec, followed by GI/Dr. Nallely Cohen    Stress Echo 7/17 Exe 8:00  no WMA  Stress echo 7/15 exe 9:00, EF 60%, no WMA  Echo 7/15 EF 65%, normal  Stress nuc 2009 nl EF 67%  Ca Score 2011 250    Fhx +CAD    We discussed the expected course, resolution and complications of the diagnosis(es) in detail. Medication risks, benefits, costs, interactions, and alternatives were discussed as indicated. I advised her to contact the office if her condition worsens, changes or fails to improve as anticipated.  She expressed understanding with the diagnosis(es) and plan    HISTORY OF PRESENTING ILLNESS      Leander Schulz is a 74 y.o. female        ACTIVE PROBLEM LIST     Patient Active Problem List    Diagnosis Date Noted    Severe obesity ([facility] [address] 02/05/2020    Hyperlipidemia LDL goal <70 07/17/2019    Bladder retention of urine 08/14/2018    PVC (premature ventricular contraction) 07/12/2017    Postmenopausal atrophic vaginitis 06/09/2014    Osteopenia 10/17/2012    Hypokalemia 04/25/2012    Coronary atherosclerosis of native coronary artery 02/14/2012    Hyperlipemia 02/14/2012    PSVT (paroxysmal supraventricular tachycardia) ([facility] [address] 02/14/2012    HTN (hypertension) 02/14/2012    Overweight (BMI 25.0-29.9) 02/14/2012           PAST MEDICAL HISTORY     Past Medical History:   Diagnosis Date    Calculus of kidney 04/2018    on CT scan - asymptomatic    Chronic venous insufficiency     fluid retention    DDD (degenerative disc disease)     GERD (gastroesophageal reflux disease)     H/O hiatal hernia     gastritis    History of actinic keratosis     basal cell - OS lower lid    Hx of squamous cell carcinoma     L lateral ankle and R posterior calf    Hypertension            PAST SURGICAL HISTORY     Past Surgical History:   Procedure Laterality Date    ENDOSCOPY, COLON, DIAGNOSTIC  5/2003 , 2013 & 2018    (Brand)-due in 10 yrs for Colon/ 2018 upper Endos - nl    HX COLONOSCOPY  06/2018    Brand - repeat in 10 years    HX DILATION AND CURETTAGE  1990's    HX HEENT  1958    T&A    HX HEENT Left 2010    left eyelid surg. - lower lid reconstruction after MOHS for basal cell    HX MOHS PROCEDURES      L ankle, R posterior calf, and L lower eyelid    RI REVISION OF LOWER EYELID Bilateral 09/2019 ALLERGIES     Allergies   Allergen Reactions    Sulfa (Sulfonamide Antibiotics) Rash     Generalized rash/hives    Boniva [Ibandronate] Other (comments)     Dysphagia      Carac [Fluorouracil] Cough and Other (comments)     Cogestion    Cozaar [Losartan] Shortness of Breath    Lisinopril Cough    Preservative Other (comments)     Preservative in eye drops make eyes red      Red Dye Rash          FAMILY HISTORY     Family History   Problem Relation Age of Onset    Cancer Father         lung (+tob)    Thyroid Disease Mother     negative for cardiac disease       SOCIAL HISTORY     Social History     Socioeconomic History    Marital status:      Spouse name: Not on file    Number of children: Not on file    Years of education: Not on file    Highest education level: Not on file   Tobacco Use    Smoking status: Never Smoker    Smokeless tobacco: Never Used   Substance and Sexual Activity    Alcohol use: Yes     Alcohol/week: 3.0 standard drinks     Types: 3 Glasses of wine per week     Comment: 3 times a week    Drug use: No    Sexual activity: Yes     Partners: Male     Social Determinants of Health     Financial Resource Strain:     Difficulty of Paying Living Expenses:    Food Insecurity:     Worried About Running Out of Food in the Last Year:     [address] in the Last Year:    Transportation Needs:     Lack of Transportation (Medical):      Lack of Transportation (Non-Medical):    Physical Activity:     Days of Exercise per Week:     Minutes of Exercise per Session:    Stress:     Feeling of Stress :    Social Connections:     Frequency of Communication with Friends and Family:     Frequency of Social Gatherings with Friends and Family:     Attends [employer] Services:     Active Member of Clubs or Organizations:     Attends Club or Organization Meetings:     Marital Status:          MEDICATIONS     Current Outpatient Medications   Medication Sig    clindamycin (CLEOCIN T) 1 % lotion     pravastatin (PRAVACHOL) 40 mg tablet Take 1 Tab by mouth nightly.  spironolactone (ALDACTONE) 25 mg tablet Take 1 Tab by mouth two (2) times a day.  amLODIPine (NORVASC) 2.5 mg tablet Please take 2 tabs in the am and 1 tab in the pm    niacinamide 500 mg tablet Take 500 mg by mouth two (2) times daily (with meals). Vitamin B3    potassium chloride SR (KLOR-CON 8) 8 mEq tablet Take 2 tablets daily as needed (when taking furosemide).  furosemide (LASIX) 20 mg tablet Take 1 Tab by mouth daily as needed.  famotidine (Pepcid) 20 mg tablet Take 20 mg by mouth two (2) times a day.  OMEGA-3 FATTY ACIDS/FISH OIL (OMEGA 3 FISH OIL PO) Take  by mouth two (2) times a day. Indications: 1220 mg fish oil 360 mg omega-e    cholecalciferol (VITAMIN D3) 1,000 unit cap Take  by mouth daily.  multivitamin (ONE A DAY) tablet Take 1 Tab by mouth daily.  cranberry extract-vit C (AZO CRANBERRY PLUS VIT C) 250-60 mg cap Take 1 Tab by mouth daily.  aspirin 81 mg tablet Take 81 mg by mouth daily.  metFORMIN (GLUCOPHAGE) 500 mg tablet Take 1 Tab by mouth daily (with dinner). (Patient not taking: Reported on 6/24/2021)    albuterol (PROVENTIL HFA, VENTOLIN HFA, PROAIR HFA) 90 mcg/actuation inhaler Take 2 Puffs by inhalation every six (6) hours as needed for Wheezing. (Patient not taking: Reported on 6/24/2021)    LORazepam (ATIVAN) 0.5 mg tablet TAKE 1 TABLET BY MOUTH TWICE A DAY AS NEEDED FOR ANXIETY (Patient not taking: Reported on 6/24/2021)     No current facility-administered medications for this visit. I have reviewed the nurses notes, vitals, problem list, allergy list, medical history, family, social history and medications. REVIEW OF SYMPTOMS     Constitutional: Negative for fever, chills, malaise/fatigue and diaphoresis. Respiratory: Negative for cough, hemoptysis, sputum production, shortness of breath and wheezing.    Cardiovascular: Negative for chest pain, palpitations, orthopnea, claudication, leg swelling and PND. Gastrointestinal: Negative for heartburn, nausea, vomiting, blood in stool and melena. Genitourinary: Negative for dysuria and flank pain. Musculoskeletal: Negative for joint pain and back pain. Skin: Negative for rash. Neurological: Negative for focal weakness, seizures, loss of consciousness, weakness and headaches. Endo/Heme/Allergies: Negative for abnormal bleeding. Psychiatric/Behavioral: Negative for memory loss. PHYSICAL EXAMINATION      General: Well developed, in no acute distress, cooperative and alert  HEENT: Pupils equal/round. No marked JVD   Respiratory: No audible wheezing, no signs of respiratory distress, lips non cyanotic  Extremities:  No edema  Neuro: A&Ox3, speech clear, no facial droop, answering questions appropriately  Skin: Skin color is normal. No rashes or lesions. Non diaphoretic on visible skin during exam       DIAGNOSTIC DATA      No specialty comments available. LABORATORY DATA      Lab Results   Component Value Date/Time    WBC 9.5 03/24/2021 08:50 AM    HGB 16.4 (H) 03/24/2021 08:50 AM    HCT 48.5 (H) 03/24/2021 08:50 AM    PLATELET 667 01/81/8197 08:50 AM    MCV 94.9 03/24/2021 08:50 AM      Lab Results   Component Value Date/Time    Sodium 143 03/24/2021 08:50 AM    Potassium 4.7 03/24/2021 08:50 AM    Chloride 104 03/24/2021 08:50 AM    CO2 22 03/24/2021 08:50 AM    Glucose 109 (H) 03/24/2021 08:50 AM    BUN 14 03/24/2021 08:50 AM    Creatinine 0.82 03/24/2021 08:50 AM    BUN/Creatinine ratio NOT APPLICABLE 67/01/6985 65:47 AM    GFR est AA 85 03/24/2021 08:50 AM    GFR est non-AA 74 03/24/2021 08:50 AM    Calcium 10.1 03/24/2021 08:50 AM    Bilirubin, total 1.1 01/22/2021 08:12 AM    Alk.  phosphatase 72 07/16/2019 11:35 AM    Protein, total 7.2 01/22/2021 08:12 AM    Albumin 4.6 01/22/2021 08:12 AM    Globulin 2.6 01/22/2021 08:12 AM    A-G Ratio 1.6 02/11/2016 10:27 AM    ALT (SGPT) 33 (H) 01/22/2021 08:12 AM FOLLOW-UP       Thank you, Berhane Larry MD for allowing me to participate in the care of Marcial Hector. Please do not hesitate to contact me for further questions/concerns.          Sayra Lindsay MD    [address]  [facility]        [phone] / [phone] Fax       [facility] [address], [address] [address]  Madeline Scott  [phone] / [phone] Fax

## 2021-07-02 ENCOUNTER — VIRTUAL VISIT (OUTPATIENT)
Dept: PRIMARY CARE CLINIC | Age: 66
End: 2021-07-02
Payer: MEDICARE

## 2021-07-02 DIAGNOSIS — R09.81 NASAL CONGESTION: ICD-10-CM

## 2021-07-02 DIAGNOSIS — J06.9 VIRAL UPPER RESPIRATORY TRACT INFECTION: Primary | ICD-10-CM

## 2021-07-02 DIAGNOSIS — R05.9 COUGH: ICD-10-CM

## 2021-07-02 PROCEDURE — 3017F COLORECTAL CA SCREEN DOC REV: CPT | Performed by: FAMILY MEDICINE

## 2021-07-02 PROCEDURE — 1101F PT FALLS ASSESS-DOCD LE1/YR: CPT | Performed by: FAMILY MEDICINE

## 2021-07-02 PROCEDURE — G8536 NO DOC ELDER MAL SCRN: HCPCS | Performed by: FAMILY MEDICINE

## 2021-07-02 PROCEDURE — G8400 PT W/DXA NO RESULTS DOC: HCPCS | Performed by: FAMILY MEDICINE

## 2021-07-02 PROCEDURE — G8432 DEP SCR NOT DOC, RNG: HCPCS | Performed by: FAMILY MEDICINE

## 2021-07-02 PROCEDURE — G8427 DOCREV CUR MEDS BY ELIG CLIN: HCPCS | Performed by: FAMILY MEDICINE

## 2021-07-02 PROCEDURE — 1090F PRES/ABSN URINE INCON ASSESS: CPT | Performed by: FAMILY MEDICINE

## 2021-07-02 PROCEDURE — G8417 CALC BMI ABV UP PARAM F/U: HCPCS | Performed by: FAMILY MEDICINE

## 2021-07-02 PROCEDURE — G8756 NO BP MEASURE DOC: HCPCS | Performed by: FAMILY MEDICINE

## 2021-07-02 PROCEDURE — 99213 OFFICE O/P EST LOW 20 MIN: CPT | Performed by: FAMILY MEDICINE

## 2021-07-02 PROCEDURE — G9899 SCRN MAM PERF RSLTS DOC: HCPCS | Performed by: FAMILY MEDICINE

## 2021-07-02 RX ORDER — FLUTICASONE PROPIONATE 50 MCG
2 SPRAY, SUSPENSION (ML) NASAL DAILY
Qty: 1 BOTTLE | Refills: 0 | Status: SHIPPED | OUTPATIENT
Start: 2021-07-02

## 2021-07-02 RX ORDER — PSEUDOEPHEDRINE HYDROCHLORIDE 60 MG/1
60 TABLET ORAL
Qty: 30 TABLET | Refills: 0 | Status: SHIPPED | OUTPATIENT
Start: 2021-07-02

## 2021-07-02 NOTE — PROGRESS NOTES
Identified pt with two pt identifiers(name and ). Chief Complaint   Patient presents with    Cough     started with sore throat on  , monday cough , runny nose and drainage denies fever         3 most recent PHQ Screens 2021   Little interest or pleasure in doing things Not at all   Feeling down, depressed, irritable, or hopeless Not at all   Total Score PHQ 2 0        There were no vitals filed for this visit. Health Maintenance Due   Topic    Shingrix Vaccine Age 50> (1 of 2)    Bone Densitometry (Dexa) Screening     Medicare Yearly Exam        1. Have you been to the ER, urgent care clinic since your last visit? Hospitalized since your last visit? No    2. Have you seen or consulted any other health care providers outside of the [address] [place] [address] since your last visit? Include any pap smears or colon screening.  Dr Tami Hodges - hematologist

## 2021-07-02 NOTE — PROGRESS NOTES
Robert Lamar is a 74 y.o. female who was seen by synchronous (real-time) audio-video technology on 7/2/2021 for Cough (started with sore throat on sunday , monday cough , runny nose and drainage denies fever )        Assessment & Plan:     Diagnoses and all orders for this visit:    1. Viral upper respiratory tract infection  -     pseudoephedrine (SUDAFED) 60 mg tablet; Take 1 Tablet by mouth every six (6) hours as needed for Congestion.  -     fluticasone propionate (FLONASE) 50 mcg/actuation nasal spray; 2 Sprays by Both Nostrils route daily. 2. Nasal congestion  -     pseudoephedrine (SUDAFED) 60 mg tablet; Take 1 Tablet by mouth every six (6) hours as needed for Congestion.  -     fluticasone propionate (FLONASE) 50 mcg/actuation nasal spray; 2 Sprays by Both Nostrils route daily. 3. Cough  -     pseudoephedrine (SUDAFED) 60 mg tablet; Take 1 Tablet by mouth every six (6) hours as needed for Congestion.  -     fluticasone propionate (FLONASE) 50 mcg/actuation nasal spray; 2 Sprays by Both Nostrils route daily. Mist inhalation, rest, hydration. Strongly advised to go to urgent care if anything worse. Follow-up and Dispositions    · Return if symptoms worsen or fail to improve. Subjective: This is a 73 y/o F is here with a complain of URI type of symptoms. Reports that   staurday she started with sore throat, runny nose sneezing. Sore throat gone away then cough started on Monday. Cough is mostly dry. Having nasal drainage. No fever, HA, body ache, CP, soa, wheezing. Did not try any medication for the above symptoms. Reports that her  has similar symptoms. Prior to Admission medications    Medication Sig Start Date End Date Taking? Authorizing Provider   clindamycin (CLEOCIN T) 1 % lotion  4/22/21  Yes Provider, Historical   pravastatin (PRAVACHOL) 40 mg tablet Take 1 Tablet by mouth nightly.  6/24/21  Yes Angeline Huitron MD   spironolactone (ALDACTONE) 25 mg tablet Take 1 Tablet by mouth two (2) times a day. 6/24/21  Yes Nakia Armenta MD   amLODIPine (NORVASC) 2.5 mg tablet Please take 2 tabs in the am and 1 tab in the pm 6/24/21  Yes Nakia Armenta MD   potassium chloride SR (Klor-Con 8) 8 mEq tablet Take 2 tablets daily as needed (when taking furosemide). 6/24/21  Yes Nakia Armenta MD   furosemide (LASIX) 20 mg tablet Take 1 Tablet by mouth daily. 6/24/21  Yes Nakia Armenta MD   famotidine (Pepcid) 20 mg tablet Take 20 mg by mouth two (2) times a day. Yes Provider, Historical   OMEGA-3 FATTY ACIDS/FISH OIL (OMEGA 3 FISH OIL PO) Take  by mouth two (2) times a day. Indications: 1220 mg fish oil 360 mg omega-e   Yes Provider, Historical   cholecalciferol (VITAMIN D3) 1,000 unit cap Take  by mouth daily. Yes Provider, Historical   multivitamin (ONE A DAY) tablet Take 1 Tab by mouth daily. Yes Provider, Historical   cranberry extract-vit C (AZO CRANBERRY PLUS VIT C) 250-60 mg cap Take 1 Tab by mouth daily. 6/9/14  Yes Matias Spencer MD   aspirin 81 mg tablet Take 81 mg by mouth daily. Yes Provider, Historical     Patient Active Problem List   Diagnosis Code    Coronary atherosclerosis of native coronary artery I25.10    Hyperlipemia E78.5    PSVT (paroxysmal supraventricular tachycardia) ([facility]) I47.1    HTN (hypertension) I10    Overweight (BMI 25.0-29. 9) E66.3    Hypokalemia E87.6    Osteopenia M85.80    Postmenopausal atrophic vaginitis N95.2    PVC (premature ventricular contraction) I49.3    Bladder retention of urine R33.9    Hyperlipidemia LDL goal <70 E78.5    Severe obesity (HCC) E66.01     Current Outpatient Medications   Medication Sig Dispense Refill    pseudoephedrine (SUDAFED) 60 mg tablet Take 1 Tablet by mouth every six (6) hours as needed for Congestion. 30 Tablet 0    fluticasone propionate (FLONASE) 50 mcg/actuation nasal spray 2 Sprays by Both Nostrils route daily.  1 Bottle 0    clindamycin (CLEOCIN T) 1 % lotion       pravastatin (PRAVACHOL) 40 mg tablet Take 1 Tablet by mouth nightly. 90 Tablet 1    spironolactone (ALDACTONE) 25 mg tablet Take 1 Tablet by mouth two (2) times a day. 180 Tablet 3    amLODIPine (NORVASC) 2.5 mg tablet Please take 2 tabs in the am and 1 tab in the pm 270 Tablet 3    potassium chloride SR (Klor-Con 8) 8 mEq tablet Take 2 tablets daily as needed (when taking furosemide). 60 Tablet 1    furosemide (LASIX) 20 mg tablet Take 1 Tablet by mouth daily. 30 Tablet 8    famotidine (Pepcid) 20 mg tablet Take 20 mg by mouth two (2) times a day.  OMEGA-3 FATTY ACIDS/FISH OIL (OMEGA 3 FISH OIL PO) Take  by mouth two (2) times a day. Indications: 1220 mg fish oil 360 mg omega-e      cholecalciferol (VITAMIN D3) 1,000 unit cap Take  by mouth daily.  multivitamin (ONE A DAY) tablet Take 1 Tab by mouth daily.  cranberry extract-vit C (AZO CRANBERRY PLUS VIT C) 250-60 mg cap Take 1 Tab by mouth daily. 30 Tab 0    aspirin 81 mg tablet Take 81 mg by mouth daily.        Allergies   Allergen Reactions    Sulfa (Sulfonamide Antibiotics) Rash     Generalized rash/hives    Boniva [Ibandronate] Other (comments)     Dysphagia      Carac [Fluorouracil] Cough and Other (comments)     Cogestion    Cozaar [Losartan] Shortness of Breath    Lisinopril Cough    Preservative Other (comments)     Preservative in eye drops make eyes red      Red Dye Rash     Past Medical History:   Diagnosis Date    Calculus of kidney 04/2018    on CT scan - asymptomatic    Chronic venous insufficiency     fluid retention    DDD (degenerative disc disease)     GERD (gastroesophageal reflux disease)     H/O hiatal hernia     gastritis    History of actinic keratosis     basal cell - OS lower lid    Hx of squamous cell carcinoma     L lateral ankle and R posterior calf    Hypertension        ROS    Objective:     Patient-Reported Vitals 7/2/2021   Patient-Reported Weight 228.4lbs Patient-Reported Height -   Patient-Reported Pulse -   Patient-Reported Systolic  -   Patient-Reported Diastolic -        [INSTRUCTIONS:  \"[x]\" Indicates a positive item  \"[]\" Indicates a negative item  -- DELETE ALL ITEMS NOT EXAMINED]    Constitutional: [x] Appears well-developed and well-nourished [x] No apparent distress      [] Abnormal -     Mental status: [x] Alert and awake  [x] Oriented to person/place/time [x] Able to follow commands    [] Abnormal -     Eyes:   EOM    [x]  Normal    [] Abnormal -   Sclera  [x]  Normal    [] Abnormal -          Discharge [x]  None visible   [] Abnormal -     HENT: [x] Normocephalic, atraumatic  [] Abnormal -   [x] Mouth/Throat: Mucous membranes are moist    External Ears [x] Normal  [] Abnormal -    Neck: [x] No visualized mass [] Abnormal -     Pulmonary/Chest: [x] Respiratory effort normal   [x] No visualized signs of difficulty breathing or respiratory distress        [] Abnormal -      Musculoskeletal:   [] Normal gait with no signs of ataxia         [x] Normal range of motion of neck        [] Abnormal -     Neurological:        [x] No Facial Asymmetry (Cranial nerve 7 motor function) (limited exam due to video visit)          [x] No gaze palsy        [] Abnormal -          Skin:        [x] No significant exanthematous lesions or discoloration noted on facial skin         [] Abnormal -            Psychiatric:       [x] Normal Affect [] Abnormal -        [x] No Hallucinations    Other pertinent observable physical exam findings:-        We discussed the expected course, resolution and complications of the diagnosis(es) in detail. Medication risks, benefits, costs, interactions, and alternatives were discussed as indicated. I advised her to contact the office if her condition worsens, changes or fails to improve as anticipated. She expressed understanding with the diagnosis(es) and plan.        Romana Schwalbe, was evaluated through a synchronous (real-time) audio-video encounter. The patient (or guardian if applicable) is aware that this is a billable service. Verbal consent to proceed has been obtained within the past 12 months. The visit was conducted pursuant to the emergency declaration under the [address] [place] [address], [address] [place] [address] authority and the [employer] General Act. Patient identification was verified, and a caregiver was present when appropriate. The patient was located in a state where the provider was credentialed to provide care.       Sklya Flores MD

## 2021-07-29 ENCOUNTER — ANCILLARY PROCEDURE (OUTPATIENT)
Dept: CARDIOLOGY CLINIC | Age: 66
End: 2021-07-29
Payer: MEDICARE

## 2021-07-29 VITALS
DIASTOLIC BLOOD PRESSURE: 80 MMHG | WEIGHT: 227 LBS | BODY MASS INDEX: 33.62 KG/M2 | HEIGHT: 69 IN | SYSTOLIC BLOOD PRESSURE: 142 MMHG

## 2021-07-29 DIAGNOSIS — E66.3 OVERWEIGHT (BMI 25.0-29.9): ICD-10-CM

## 2021-07-29 DIAGNOSIS — I25.10 ATHEROSCLEROSIS OF NATIVE CORONARY ARTERY OF NATIVE HEART WITHOUT ANGINA PECTORIS: ICD-10-CM

## 2021-07-29 DIAGNOSIS — E87.6 HYPOKALEMIA: ICD-10-CM

## 2021-07-29 DIAGNOSIS — E78.5 HYPERLIPIDEMIA LDL GOAL <70: ICD-10-CM

## 2021-07-29 DIAGNOSIS — E78.5 HYPERLIPIDEMIA, UNSPECIFIED HYPERLIPIDEMIA TYPE: ICD-10-CM

## 2021-07-29 DIAGNOSIS — I49.3 PVC (PREMATURE VENTRICULAR CONTRACTION): ICD-10-CM

## 2021-07-29 DIAGNOSIS — E66.01 SEVERE OBESITY (HCC): ICD-10-CM

## 2021-07-29 DIAGNOSIS — I10 ESSENTIAL HYPERTENSION: ICD-10-CM

## 2021-07-29 DIAGNOSIS — I47.1 PSVT (PAROXYSMAL SUPRAVENTRICULAR TACHYCARDIA) (HCC): ICD-10-CM

## 2021-07-29 PROCEDURE — 93018 CV STRESS TEST I&R ONLY: CPT | Performed by: INTERNAL MEDICINE

## 2021-07-29 PROCEDURE — 93016 CV STRESS TEST SUPVJ ONLY: CPT | Performed by: INTERNAL MEDICINE

## 2021-07-29 PROCEDURE — 78452 HT MUSCLE IMAGE SPECT MULT: CPT | Performed by: INTERNAL MEDICINE

## 2021-07-29 PROCEDURE — 93017 CV STRESS TEST TRACING ONLY: CPT | Performed by: INTERNAL MEDICINE

## 2021-07-29 PROCEDURE — A9500 TC99M SESTAMIBI: HCPCS | Performed by: INTERNAL MEDICINE

## 2021-07-29 RX ORDER — TETRAKIS(2-METHOXYISOBUTYLISOCYANIDE)COPPER(I) TETRAFLUOROBORATE 1 MG/ML
10 INJECTION, POWDER, LYOPHILIZED, FOR SOLUTION INTRAVENOUS ONCE
Status: COMPLETED | OUTPATIENT
Start: 2021-07-29 | End: 2021-07-29

## 2021-07-29 RX ORDER — TETRAKIS(2-METHOXYISOBUTYLISOCYANIDE)COPPER(I) TETRAFLUOROBORATE 1 MG/ML
30 INJECTION, POWDER, LYOPHILIZED, FOR SOLUTION INTRAVENOUS ONCE
Status: COMPLETED | OUTPATIENT
Start: 2021-07-29 | End: 2021-07-29

## 2021-07-29 RX ADMIN — TECHNETIUM TC 99M SESTAMIBI 8.1 MILLICURIE: 1 INJECTION INTRAVENOUS at 13:40

## 2021-07-29 RX ADMIN — REGADENOSON 0.4 MG: 0.08 INJECTION, SOLUTION INTRAVENOUS at 14:55

## 2021-07-29 RX ADMIN — TETRAKIS(2-METHOXYISOBUTYLISOCYANIDE)COPPER(I) TETRAFLUOROBORATE 26.31 MILLICURIE: 1 INJECTION, POWDER, LYOPHILIZED, FOR SOLUTION INTRAVENOUS at 14:55

## 2021-07-30 LAB
STRESS BASELINE DIAS BP: 80 MMHG
STRESS BASELINE HR: 67 BPM
STRESS BASELINE SYS BP: 142 MMHG
STRESS O2 SAT PEAK: 97 %
STRESS O2 SAT REST: 100 %
STRESS PEAK DIAS BP: 62 MMHG
STRESS PEAK SYS BP: 130 MMHG
STRESS PERCENT HR ACHIEVED: 58 %
STRESS POST PEAK HR: 88 BPM
STRESS RATE PRESSURE PRODUCT: NORMAL BPM*MMHG
STRESS ST DEPRESSION: 0 MM
STRESS ST ELEVATION: 0 MM
STRESS TARGET HR: 152 BPM

## 2022-01-14 ENCOUNTER — DOCUMENTATION ONLY (OUTPATIENT)
Dept: CARDIOLOGY | Age: 67
End: 2022-01-14

## 2022-01-14 NOTE — PROGRESS NOTES
PCP: Kerry Sebastian MD  HPI: Sade Cerrato is a 74 y.o. female who presents today for follow up regarding her CAD, dyspnea. She reports only some fluid retention / swelling in her legs that is well controlled by her medications. Overall she is feeling very well. She hasn't been checking her BP and  will get back on it. Last check was more like 144 systolic. Needs to get back to exercising but hasn't been doing it recently. She had gotten to 248 last May, went on [employer] and had lost 33 pounds by Sept then just stopped losing. Now back up to 231. She feels more short of breath with the weight going back up. Her knee is bothering her a lot. Had polycythemia, seeing heme- released by Dr. Katya Toscano    -has RJ and now on CPAP. Treatment has lowered her hgb and now released from [facility]  Got amniotic fluid injection in her knee which helped a little bit for a while     Cholesterol looked good   Does wear compression stockings if standing all day or out in the heat. Doing fine on the spironolactone but BP creeping up today. Talked through exercise, standing desk etc but no easy solution for her right now     Her amlodipine at 5/2.5 seems to be working okay her blood pressure is a little bit high today but she is anxious about this visit. Normally it is better     A/P:  1. CAD- calcium score of 250 2011, continue aspirin and pravastatin  2. Hyperlipidemia- LDL near goal, cont statin  3. Overweight- Body mass index is 33 encouraged her to exercise  4.  PSVT- palpitations well controlled now, could not tolerate Bystolic, previously discussed suppression medications and anti-anxiety medications, not taking Verapamil SR (had been on it in her 30s for SVT at 220bpm but then stopped by Dr. Durant Sites years later), now doing ok with the change in diuretic so potassium seems to be a culprit  -palpitations worsened by dyazide but for a while doing ok on dyazide/spironolactone/KCL combination, now palps good on spironolactone alone  -PVC were very back in the past but doing ok. 5. HTN- at goal now on amlodipine today and spironolactone  6. Elevated AST/ALT- resolved   7. Insulin Resistance-  continue diet and exercise   8. Vit D deficiency- on 1000 units daily   9. Hypokalemia - stable now, on supplements  10. Edema - well controlled now on spironolactone and dyazide  11.  GERD - on Prilosec, followed by GI/Dr. Cohen Eng nuc 7/21 EF 65% tid 1.1 normal, no ischemia, small breast shadow  Stress Echo 7/17 Exe 8:00  no WMA  Stress echo 7/15 exe 9:00, EF 60%, no WMA  Echo 7/15 EF 65%, normal  Stress nuc 2009 nl EF 67%  Ca Score 2011 250     Fhx +CAD

## 2022-03-18 PROBLEM — E78.5 HYPERLIPIDEMIA LDL GOAL <70: Status: ACTIVE | Noted: 2019-07-17

## 2022-03-18 PROBLEM — I49.3 PVC (PREMATURE VENTRICULAR CONTRACTION): Status: ACTIVE | Noted: 2017-07-12

## 2022-03-19 PROBLEM — E66.01 SEVERE OBESITY (HCC): Status: ACTIVE | Noted: 2020-02-05

## 2022-03-20 PROBLEM — R33.9 BLADDER RETENTION OF URINE: Status: ACTIVE | Noted: 2018-08-14

## 2022-08-04 NOTE — PROGRESS NOTES
Result sent to patient via [place]. [employer] Pregnancy And Lactation Text: This medication is Pregnancy Category B and is considered safe. There is some data to suggest avoiding during the third trimester. It is unknown if this medication is excreted in breast milk.

## 2024-01-29 ENCOUNTER — TELEPHONE (OUTPATIENT)
Dept: PRIMARY CARE CLINIC | Facility: CLINIC | Age: 69
End: 2024-01-29

## 2024-01-29 NOTE — TELEPHONE ENCOUNTER
Left message for patient to schedule new patient appointment with Dr Bueno in May.  Ok for [facility] to schedule.